# Patient Record
Sex: MALE | Race: BLACK OR AFRICAN AMERICAN | NOT HISPANIC OR LATINO | URBAN - METROPOLITAN AREA
[De-identification: names, ages, dates, MRNs, and addresses within clinical notes are randomized per-mention and may not be internally consistent; named-entity substitution may affect disease eponyms.]

---

## 2017-01-01 ENCOUNTER — INPATIENT (INPATIENT)
Facility: HOSPITAL | Age: 72
LOS: 16 days | Discharge: HOSPICE | DRG: 177 | End: 2017-02-11
Attending: INTERNAL MEDICINE
Payer: MEDICARE

## 2017-01-01 ENCOUNTER — INPATIENT (INPATIENT)
Facility: HOSPITAL | Age: 72
LOS: 0 days | DRG: 871 | End: 2017-03-10
Attending: INTERNAL MEDICINE | Admitting: INTERNAL MEDICINE
Payer: MEDICARE

## 2017-01-01 VITALS
HEIGHT: 67 IN | HEART RATE: 80 BPM | DIASTOLIC BLOOD PRESSURE: 83 MMHG | OXYGEN SATURATION: 100 % | RESPIRATION RATE: 20 BRPM | SYSTOLIC BLOOD PRESSURE: 139 MMHG | WEIGHT: 119.93 LBS

## 2017-01-01 VITALS
SYSTOLIC BLOOD PRESSURE: 88 MMHG | DIASTOLIC BLOOD PRESSURE: 55 MMHG | HEART RATE: 90 BPM | RESPIRATION RATE: 26 BRPM | WEIGHT: 115.08 LBS | OXYGEN SATURATION: 94 %

## 2017-01-01 VITALS
HEART RATE: 71 BPM | DIASTOLIC BLOOD PRESSURE: 68 MMHG | TEMPERATURE: 98 F | RESPIRATION RATE: 18 BRPM | OXYGEN SATURATION: 100 % | SYSTOLIC BLOOD PRESSURE: 108 MMHG

## 2017-01-01 VITALS — RESPIRATION RATE: 8 BRPM | OXYGEN SATURATION: 87 %

## 2017-01-01 DIAGNOSIS — G20 PARKINSON'S DISEASE: ICD-10-CM

## 2017-01-01 DIAGNOSIS — J18.9 PNEUMONIA, UNSPECIFIED ORGANISM: ICD-10-CM

## 2017-01-01 DIAGNOSIS — Z51.5 ENCOUNTER FOR PALLIATIVE CARE: ICD-10-CM

## 2017-01-01 DIAGNOSIS — J96.01 ACUTE RESPIRATORY FAILURE WITH HYPOXIA: ICD-10-CM

## 2017-01-01 DIAGNOSIS — T17.908A UNSPECIFIED FOREIGN BODY IN RESPIRATORY TRACT, PART UNSPECIFIED CAUSING OTHER INJURY, INITIAL ENCOUNTER: ICD-10-CM

## 2017-01-01 DIAGNOSIS — R13.10 DYSPHAGIA, UNSPECIFIED: ICD-10-CM

## 2017-01-01 DIAGNOSIS — J06.9 ACUTE UPPER RESPIRATORY INFECTION, UNSPECIFIED: ICD-10-CM

## 2017-01-01 DIAGNOSIS — R57.9 SHOCK, UNSPECIFIED: ICD-10-CM

## 2017-01-01 DIAGNOSIS — F41.9 ANXIETY DISORDER, UNSPECIFIED: ICD-10-CM

## 2017-01-01 DIAGNOSIS — Z98.890 OTHER SPECIFIED POSTPROCEDURAL STATES: Chronic | ICD-10-CM

## 2017-01-01 DIAGNOSIS — R53.81 OTHER MALAISE: ICD-10-CM

## 2017-01-01 LAB
ALBUMIN SERPL ELPH-MCNC: 4 G/DL — SIGNIFICANT CHANGE UP (ref 3.3–5.2)
ALP SERPL-CCNC: 246 U/L — HIGH (ref 40–120)
ALT FLD-CCNC: 39 U/L — SIGNIFICANT CHANGE UP
ANION GAP SERPL CALC-SCNC: 10 MMOL/L — SIGNIFICANT CHANGE UP (ref 5–17)
ANION GAP SERPL CALC-SCNC: 11 MMOL/L — SIGNIFICANT CHANGE UP (ref 5–17)
ANION GAP SERPL CALC-SCNC: 12 MMOL/L — SIGNIFICANT CHANGE UP (ref 5–17)
ANION GAP SERPL CALC-SCNC: 13 MMOL/L — SIGNIFICANT CHANGE UP (ref 5–17)
ANION GAP SERPL CALC-SCNC: 17 MMOL/L — SIGNIFICANT CHANGE UP (ref 5–17)
ANION GAP SERPL CALC-SCNC: 9 MMOL/L — SIGNIFICANT CHANGE UP (ref 5–17)
ANISOCYTOSIS BLD QL: SLIGHT — SIGNIFICANT CHANGE UP
ANISOCYTOSIS BLD QL: SLIGHT — SIGNIFICANT CHANGE UP
APPEARANCE UR: CLEAR — SIGNIFICANT CHANGE UP
APPEARANCE UR: CLEAR — SIGNIFICANT CHANGE UP
AST SERPL-CCNC: 42 U/L — HIGH
BASE EXCESS BLDA CALC-SCNC: -1.1 MMOL/L — SIGNIFICANT CHANGE UP (ref -3–3)
BASOPHILS # BLD AUTO: 0 K/UL — SIGNIFICANT CHANGE UP (ref 0–0.2)
BASOPHILS NFR BLD AUTO: 1 % — SIGNIFICANT CHANGE UP (ref 0–2)
BASOPHILS NFR BLD AUTO: 2.9 % — HIGH (ref 0–2)
BILIRUB SERPL-MCNC: 0.5 MG/DL — SIGNIFICANT CHANGE UP (ref 0.4–2)
BILIRUB UR-MCNC: NEGATIVE — SIGNIFICANT CHANGE UP
BILIRUB UR-MCNC: NEGATIVE — SIGNIFICANT CHANGE UP
BLOOD DRAW TIME CLOCK TIME: SIGNIFICANT CHANGE UP
BLOOD GAS COMMENTS ARTERIAL: SIGNIFICANT CHANGE UP
BUN SERPL-MCNC: 12 MG/DL — SIGNIFICANT CHANGE UP (ref 8–20)
BUN SERPL-MCNC: 17 MG/DL — SIGNIFICANT CHANGE UP (ref 8–20)
BUN SERPL-MCNC: 18 MG/DL — SIGNIFICANT CHANGE UP (ref 8–20)
BUN SERPL-MCNC: 19 MG/DL — SIGNIFICANT CHANGE UP (ref 8–20)
BUN SERPL-MCNC: 19 MG/DL — SIGNIFICANT CHANGE UP (ref 8–20)
BUN SERPL-MCNC: 20 MG/DL — SIGNIFICANT CHANGE UP (ref 8–20)
BUN SERPL-MCNC: 21 MG/DL — HIGH (ref 8–20)
BUN SERPL-MCNC: 21 MG/DL — HIGH (ref 8–20)
BUN SERPL-MCNC: 25 MG/DL — HIGH (ref 8–20)
CALCIUM SERPL-MCNC: 10.4 MG/DL — HIGH (ref 8.6–10.2)
CALCIUM SERPL-MCNC: 8.6 MG/DL — SIGNIFICANT CHANGE UP (ref 8.6–10.2)
CALCIUM SERPL-MCNC: 8.8 MG/DL — SIGNIFICANT CHANGE UP (ref 8.6–10.2)
CALCIUM SERPL-MCNC: 8.9 MG/DL — SIGNIFICANT CHANGE UP (ref 8.6–10.2)
CALCIUM SERPL-MCNC: 9 MG/DL — SIGNIFICANT CHANGE UP (ref 8.6–10.2)
CALCIUM SERPL-MCNC: 9.1 MG/DL — SIGNIFICANT CHANGE UP (ref 8.6–10.2)
CALCIUM SERPL-MCNC: 9.2 MG/DL — SIGNIFICANT CHANGE UP (ref 8.6–10.2)
CALCIUM SERPL-MCNC: 9.3 MG/DL — SIGNIFICANT CHANGE UP (ref 8.6–10.2)
CALCIUM SERPL-MCNC: 9.3 MG/DL — SIGNIFICANT CHANGE UP (ref 8.6–10.2)
CALCIUM SERPL-MCNC: 9.4 MG/DL — SIGNIFICANT CHANGE UP (ref 8.6–10.2)
CHLORIDE SERPL-SCNC: 101 MMOL/L — SIGNIFICANT CHANGE UP (ref 98–107)
CHLORIDE SERPL-SCNC: 102 MMOL/L — SIGNIFICANT CHANGE UP (ref 98–107)
CHLORIDE SERPL-SCNC: 102 MMOL/L — SIGNIFICANT CHANGE UP (ref 98–107)
CHLORIDE SERPL-SCNC: 103 MMOL/L — SIGNIFICANT CHANGE UP (ref 98–107)
CHLORIDE SERPL-SCNC: 105 MMOL/L — SIGNIFICANT CHANGE UP (ref 98–107)
CHLORIDE SERPL-SCNC: 106 MMOL/L — SIGNIFICANT CHANGE UP (ref 98–107)
CHLORIDE SERPL-SCNC: 107 MMOL/L — SIGNIFICANT CHANGE UP (ref 98–107)
CHLORIDE SERPL-SCNC: 107 MMOL/L — SIGNIFICANT CHANGE UP (ref 98–107)
CHLORIDE SERPL-SCNC: 108 MMOL/L — HIGH (ref 98–107)
CHLORIDE SERPL-SCNC: 113 MMOL/L — HIGH (ref 98–107)
CHLORIDE SERPL-SCNC: 98 MMOL/L — SIGNIFICANT CHANGE UP (ref 98–107)
CHLORIDE SERPL-SCNC: 98 MMOL/L — SIGNIFICANT CHANGE UP (ref 98–107)
CO2 SERPL-SCNC: 19 MMOL/L — LOW (ref 22–29)
CO2 SERPL-SCNC: 23 MMOL/L — SIGNIFICANT CHANGE UP (ref 22–29)
CO2 SERPL-SCNC: 24 MMOL/L — SIGNIFICANT CHANGE UP (ref 22–29)
CO2 SERPL-SCNC: 24 MMOL/L — SIGNIFICANT CHANGE UP (ref 22–29)
CO2 SERPL-SCNC: 25 MMOL/L — SIGNIFICANT CHANGE UP (ref 22–29)
CO2 SERPL-SCNC: 25 MMOL/L — SIGNIFICANT CHANGE UP (ref 22–29)
CO2 SERPL-SCNC: 26 MMOL/L — SIGNIFICANT CHANGE UP (ref 22–29)
CO2 SERPL-SCNC: 26 MMOL/L — SIGNIFICANT CHANGE UP (ref 22–29)
CO2 SERPL-SCNC: 27 MMOL/L — SIGNIFICANT CHANGE UP (ref 22–29)
CO2 SERPL-SCNC: 28 MMOL/L — SIGNIFICANT CHANGE UP (ref 22–29)
CO2 SERPL-SCNC: 28 MMOL/L — SIGNIFICANT CHANGE UP (ref 22–29)
CO2 SERPL-SCNC: 30 MMOL/L — HIGH (ref 22–29)
COLOR SPEC: YELLOW — SIGNIFICANT CHANGE UP
COLOR SPEC: YELLOW — SIGNIFICANT CHANGE UP
COMMENT - URINE: SIGNIFICANT CHANGE UP
CORTIS AM PEAK SERPL-MCNC: 39.3 UG/DL — HIGH (ref 6.2–19.4)
CREAT SERPL-MCNC: 0.69 MG/DL — SIGNIFICANT CHANGE UP (ref 0.5–1.3)
CREAT SERPL-MCNC: 0.71 MG/DL — SIGNIFICANT CHANGE UP (ref 0.5–1.3)
CREAT SERPL-MCNC: 0.75 MG/DL — SIGNIFICANT CHANGE UP (ref 0.5–1.3)
CREAT SERPL-MCNC: 0.78 MG/DL — SIGNIFICANT CHANGE UP (ref 0.5–1.3)
CREAT SERPL-MCNC: 0.8 MG/DL — SIGNIFICANT CHANGE UP (ref 0.5–1.3)
CREAT SERPL-MCNC: 0.83 MG/DL — SIGNIFICANT CHANGE UP (ref 0.5–1.3)
CREAT SERPL-MCNC: 0.85 MG/DL — SIGNIFICANT CHANGE UP (ref 0.5–1.3)
CREAT SERPL-MCNC: 0.87 MG/DL — SIGNIFICANT CHANGE UP (ref 0.5–1.3)
CREAT SERPL-MCNC: 0.89 MG/DL — SIGNIFICANT CHANGE UP (ref 0.5–1.3)
CREAT SERPL-MCNC: 0.89 MG/DL — SIGNIFICANT CHANGE UP (ref 0.5–1.3)
CREAT SERPL-MCNC: 0.91 MG/DL — SIGNIFICANT CHANGE UP (ref 0.5–1.3)
CREAT SERPL-MCNC: 0.93 MG/DL — SIGNIFICANT CHANGE UP (ref 0.5–1.3)
CULTURE RESULTS: NO GROWTH — SIGNIFICANT CHANGE UP
CULTURE RESULTS: NO GROWTH — SIGNIFICANT CHANGE UP
CULTURE RESULTS: SIGNIFICANT CHANGE UP
CULTURE RESULTS: SIGNIFICANT CHANGE UP
DIFF PNL FLD: ABNORMAL
DIFF PNL FLD: ABNORMAL
ELLIPTOCYTES BLD QL SMEAR: SLIGHT — SIGNIFICANT CHANGE UP
EPI CELLS # UR: SIGNIFICANT CHANGE UP
GAS PNL BLDA: SIGNIFICANT CHANGE UP
GLUCOSE SERPL-MCNC: 100 MG/DL — SIGNIFICANT CHANGE UP (ref 70–115)
GLUCOSE SERPL-MCNC: 100 MG/DL — SIGNIFICANT CHANGE UP (ref 70–115)
GLUCOSE SERPL-MCNC: 101 MG/DL — SIGNIFICANT CHANGE UP (ref 70–115)
GLUCOSE SERPL-MCNC: 114 MG/DL — SIGNIFICANT CHANGE UP (ref 70–115)
GLUCOSE SERPL-MCNC: 118 MG/DL — HIGH (ref 70–115)
GLUCOSE SERPL-MCNC: 119 MG/DL — HIGH (ref 70–115)
GLUCOSE SERPL-MCNC: 129 MG/DL — HIGH (ref 70–115)
GLUCOSE SERPL-MCNC: 135 MG/DL — HIGH (ref 70–115)
GLUCOSE SERPL-MCNC: 140 MG/DL — HIGH (ref 70–115)
GLUCOSE SERPL-MCNC: 74 MG/DL — SIGNIFICANT CHANGE UP (ref 70–115)
GLUCOSE SERPL-MCNC: 76 MG/DL — SIGNIFICANT CHANGE UP (ref 70–115)
GLUCOSE SERPL-MCNC: 78 MG/DL — SIGNIFICANT CHANGE UP (ref 70–115)
GLUCOSE UR QL: NEGATIVE MG/DL — SIGNIFICANT CHANGE UP
GLUCOSE UR QL: NEGATIVE MG/DL — SIGNIFICANT CHANGE UP
HCO3 BLDA-SCNC: 23 MMOL/L — SIGNIFICANT CHANGE UP (ref 20–26)
HCT VFR BLD CALC: 31.6 % — LOW (ref 42–52)
HCT VFR BLD CALC: 32.3 % — LOW (ref 42–52)
HCT VFR BLD CALC: 32.6 % — LOW (ref 42–52)
HCT VFR BLD CALC: 33.6 % — LOW (ref 42–52)
HCT VFR BLD CALC: 34.5 % — LOW (ref 42–52)
HCT VFR BLD CALC: 34.6 % — LOW (ref 42–52)
HCT VFR BLD CALC: 34.6 % — LOW (ref 42–52)
HCT VFR BLD CALC: 34.8 % — LOW (ref 42–52)
HCT VFR BLD CALC: 35.4 % — LOW (ref 42–52)
HCT VFR BLD CALC: 37.5 % — LOW (ref 42–52)
HCT VFR BLD CALC: 38 % — LOW (ref 42–52)
HCT VFR BLD CALC: 47.4 % — SIGNIFICANT CHANGE UP (ref 42–52)
HGB BLD-MCNC: 10.7 G/DL — LOW (ref 14–18)
HGB BLD-MCNC: 11.1 G/DL — LOW (ref 14–18)
HGB BLD-MCNC: 11.1 G/DL — LOW (ref 14–18)
HGB BLD-MCNC: 11.3 G/DL — LOW (ref 14–18)
HGB BLD-MCNC: 11.7 G/DL — LOW (ref 14–18)
HGB BLD-MCNC: 11.8 G/DL — LOW (ref 14–18)
HGB BLD-MCNC: 11.9 G/DL — LOW (ref 14–18)
HGB BLD-MCNC: 12 G/DL — LOW (ref 14–18)
HGB BLD-MCNC: 12 G/DL — LOW (ref 14–18)
HGB BLD-MCNC: 12.9 G/DL — LOW (ref 14–18)
HGB BLD-MCNC: 13 G/DL — LOW (ref 14–18)
HGB BLD-MCNC: 16.2 G/DL — SIGNIFICANT CHANGE UP (ref 14–18)
HOROWITZ INDEX BLDA+IHG-RTO: SIGNIFICANT CHANGE UP
HYPOCHROMIA BLD QL: SLIGHT — SIGNIFICANT CHANGE UP
HYPOCHROMIA BLD QL: SLIGHT — SIGNIFICANT CHANGE UP
KETONES UR-MCNC: NEGATIVE — SIGNIFICANT CHANGE UP
KETONES UR-MCNC: NEGATIVE — SIGNIFICANT CHANGE UP
LACTATE BLDV-MCNC: 1.6 MMOL/L — SIGNIFICANT CHANGE UP (ref 0.7–2)
LACTATE BLDV-MCNC: 5.6 MMOL/L — CRITICAL HIGH (ref 0.7–2)
LACTATE BLDV-MCNC: 6.3 MMOL/L — CRITICAL HIGH (ref 0.7–2)
LACTATE SERPL-SCNC: 1.5 MMOL/L — SIGNIFICANT CHANGE UP (ref 0.5–2)
LEUKOCYTE ESTERASE UR-ACNC: NEGATIVE — SIGNIFICANT CHANGE UP
LEUKOCYTE ESTERASE UR-ACNC: NEGATIVE — SIGNIFICANT CHANGE UP
LG PLATELETS BLD QL AUTO: SLIGHT — SIGNIFICANT CHANGE UP
LYMPHOCYTES # BLD AUTO: 0.2 K/UL — LOW (ref 1–4.8)
LYMPHOCYTES # BLD AUTO: 51.6 % — SIGNIFICANT CHANGE UP (ref 20–55)
LYMPHOCYTES # BLD AUTO: 6 % — LOW (ref 20–55)
MACROCYTES BLD QL: SLIGHT — SIGNIFICANT CHANGE UP
MACROCYTES BLD QL: SLIGHT — SIGNIFICANT CHANGE UP
MAGNESIUM SERPL-MCNC: 2 MG/DL — SIGNIFICANT CHANGE UP (ref 1.8–2.5)
MAGNESIUM SERPL-MCNC: 2 MG/DL — SIGNIFICANT CHANGE UP (ref 1.8–2.5)
MAGNESIUM SERPL-MCNC: 2.1 MG/DL — SIGNIFICANT CHANGE UP (ref 1.8–2.5)
MAGNESIUM SERPL-MCNC: 2.2 MG/DL — SIGNIFICANT CHANGE UP (ref 1.8–2.5)
MCHC RBC-ENTMCNC: 32.3 PG — HIGH (ref 27–31)
MCHC RBC-ENTMCNC: 32.5 PG — HIGH (ref 27–31)
MCHC RBC-ENTMCNC: 32.6 PG — HIGH (ref 27–31)
MCHC RBC-ENTMCNC: 32.7 PG — HIGH (ref 27–31)
MCHC RBC-ENTMCNC: 32.8 PG — HIGH (ref 27–31)
MCHC RBC-ENTMCNC: 32.9 PG — HIGH (ref 27–31)
MCHC RBC-ENTMCNC: 33 PG — HIGH (ref 27–31)
MCHC RBC-ENTMCNC: 33.1 PG — HIGH (ref 27–31)
MCHC RBC-ENTMCNC: 33.2 PG — HIGH (ref 27–31)
MCHC RBC-ENTMCNC: 33.3 PG — HIGH (ref 27–31)
MCHC RBC-ENTMCNC: 33.6 G/DL — SIGNIFICANT CHANGE UP (ref 32–36)
MCHC RBC-ENTMCNC: 33.6 G/DL — SIGNIFICANT CHANGE UP (ref 32–36)
MCHC RBC-ENTMCNC: 33.9 G/DL — SIGNIFICANT CHANGE UP (ref 32–36)
MCHC RBC-ENTMCNC: 33.9 G/DL — SIGNIFICANT CHANGE UP (ref 32–36)
MCHC RBC-ENTMCNC: 34 G/DL — SIGNIFICANT CHANGE UP (ref 32–36)
MCHC RBC-ENTMCNC: 34.1 G/DL — SIGNIFICANT CHANGE UP (ref 32–36)
MCHC RBC-ENTMCNC: 34.2 G/DL — SIGNIFICANT CHANGE UP (ref 32–36)
MCHC RBC-ENTMCNC: 34.2 G/DL — SIGNIFICANT CHANGE UP (ref 32–36)
MCHC RBC-ENTMCNC: 34.4 G/DL — SIGNIFICANT CHANGE UP (ref 32–36)
MCHC RBC-ENTMCNC: 34.4 G/DL — SIGNIFICANT CHANGE UP (ref 32–36)
MCHC RBC-ENTMCNC: 34.5 G/DL — SIGNIFICANT CHANGE UP (ref 32–36)
MCHC RBC-ENTMCNC: 34.7 G/DL — SIGNIFICANT CHANGE UP (ref 32–36)
MCV RBC AUTO: 94.4 FL — HIGH (ref 80–94)
MCV RBC AUTO: 94.8 FL — HIGH (ref 80–94)
MCV RBC AUTO: 94.8 FL — HIGH (ref 80–94)
MCV RBC AUTO: 95.2 FL — HIGH (ref 80–94)
MCV RBC AUTO: 96 FL — HIGH (ref 80–94)
MCV RBC AUTO: 96.2 FL — HIGH (ref 80–94)
MCV RBC AUTO: 96.4 FL — HIGH (ref 80–94)
MCV RBC AUTO: 96.4 FL — HIGH (ref 80–94)
MCV RBC AUTO: 96.6 FL — HIGH (ref 80–94)
MCV RBC AUTO: 96.9 FL — HIGH (ref 80–94)
MCV RBC AUTO: 97.5 FL — HIGH (ref 80–94)
MCV RBC AUTO: 97.5 FL — HIGH (ref 80–94)
MICROCYTES BLD QL: SLIGHT — SIGNIFICANT CHANGE UP
MICROCYTES BLD QL: SLIGHT — SIGNIFICANT CHANGE UP
MONOCYTES # BLD AUTO: 0 K/UL — SIGNIFICANT CHANGE UP (ref 0–0.8)
MONOCYTES NFR BLD AUTO: 5 % — SIGNIFICANT CHANGE UP (ref 3–10)
MONOCYTES NFR BLD AUTO: 9.7 % — SIGNIFICANT CHANGE UP (ref 3–10)
NEUTROPHILS # BLD AUTO: 0.1 K/UL — LOW (ref 1.8–8)
NEUTROPHILS NFR BLD AUTO: 35.5 % — LOW (ref 37–73)
NEUTROPHILS NFR BLD AUTO: 87 % — HIGH (ref 37–73)
NITRITE UR-MCNC: NEGATIVE — SIGNIFICANT CHANGE UP
NITRITE UR-MCNC: NEGATIVE — SIGNIFICANT CHANGE UP
OVALOCYTES BLD QL SMEAR: SLIGHT — SIGNIFICANT CHANGE UP
PCO2 BLDA: 35 MMHG — SIGNIFICANT CHANGE UP (ref 35–45)
PH BLDA: 7.42 — SIGNIFICANT CHANGE UP (ref 7.35–7.45)
PH UR: 5 — SIGNIFICANT CHANGE UP (ref 4.8–8)
PH UR: 6 — SIGNIFICANT CHANGE UP (ref 4.8–8)
PLAT MORPH BLD: NORMAL — SIGNIFICANT CHANGE UP
PLAT MORPH BLD: NORMAL — SIGNIFICANT CHANGE UP
PLATELET # BLD AUTO: 151 K/UL — SIGNIFICANT CHANGE UP (ref 150–400)
PLATELET # BLD AUTO: 172 K/UL — SIGNIFICANT CHANGE UP (ref 150–400)
PLATELET # BLD AUTO: 173 K/UL — SIGNIFICANT CHANGE UP (ref 150–400)
PLATELET # BLD AUTO: 177 K/UL — SIGNIFICANT CHANGE UP (ref 150–400)
PLATELET # BLD AUTO: 193 K/UL — SIGNIFICANT CHANGE UP (ref 150–400)
PLATELET # BLD AUTO: 200 K/UL — SIGNIFICANT CHANGE UP (ref 150–400)
PLATELET # BLD AUTO: 267 K/UL — SIGNIFICANT CHANGE UP (ref 150–400)
PLATELET # BLD AUTO: 268 K/UL — SIGNIFICANT CHANGE UP (ref 150–400)
PLATELET # BLD AUTO: 312 K/UL — SIGNIFICANT CHANGE UP (ref 150–400)
PLATELET # BLD AUTO: 322 K/UL — SIGNIFICANT CHANGE UP (ref 150–400)
PLATELET # BLD AUTO: 460 K/UL — HIGH (ref 150–400)
PLATELET # BLD AUTO: 97 K/UL — LOW (ref 150–400)
PO2 BLDA: 80 MMHG — LOW (ref 83–108)
POIKILOCYTOSIS BLD QL AUTO: SLIGHT — SIGNIFICANT CHANGE UP
POIKILOCYTOSIS BLD QL AUTO: SLIGHT — SIGNIFICANT CHANGE UP
POTASSIUM SERPL-MCNC: 3.1 MMOL/L — LOW (ref 3.5–5.3)
POTASSIUM SERPL-MCNC: 3.6 MMOL/L — SIGNIFICANT CHANGE UP (ref 3.5–5.3)
POTASSIUM SERPL-MCNC: 3.7 MMOL/L — SIGNIFICANT CHANGE UP (ref 3.5–5.3)
POTASSIUM SERPL-MCNC: 3.7 MMOL/L — SIGNIFICANT CHANGE UP (ref 3.5–5.3)
POTASSIUM SERPL-MCNC: 3.9 MMOL/L — SIGNIFICANT CHANGE UP (ref 3.5–5.3)
POTASSIUM SERPL-MCNC: 4.1 MMOL/L — SIGNIFICANT CHANGE UP (ref 3.5–5.3)
POTASSIUM SERPL-MCNC: 4.2 MMOL/L — SIGNIFICANT CHANGE UP (ref 3.5–5.3)
POTASSIUM SERPL-MCNC: 4.3 MMOL/L — SIGNIFICANT CHANGE UP (ref 3.5–5.3)
POTASSIUM SERPL-MCNC: 4.4 MMOL/L — SIGNIFICANT CHANGE UP (ref 3.5–5.3)
POTASSIUM SERPL-MCNC: 4.8 MMOL/L — SIGNIFICANT CHANGE UP (ref 3.5–5.3)
POTASSIUM SERPL-MCNC: 5.1 MMOL/L — SIGNIFICANT CHANGE UP (ref 3.5–5.3)
POTASSIUM SERPL-MCNC: 5.5 MMOL/L — HIGH (ref 3.5–5.3)
POTASSIUM SERPL-SCNC: 3.1 MMOL/L — LOW (ref 3.5–5.3)
POTASSIUM SERPL-SCNC: 3.6 MMOL/L — SIGNIFICANT CHANGE UP (ref 3.5–5.3)
POTASSIUM SERPL-SCNC: 3.7 MMOL/L — SIGNIFICANT CHANGE UP (ref 3.5–5.3)
POTASSIUM SERPL-SCNC: 3.7 MMOL/L — SIGNIFICANT CHANGE UP (ref 3.5–5.3)
POTASSIUM SERPL-SCNC: 3.9 MMOL/L — SIGNIFICANT CHANGE UP (ref 3.5–5.3)
POTASSIUM SERPL-SCNC: 4.1 MMOL/L — SIGNIFICANT CHANGE UP (ref 3.5–5.3)
POTASSIUM SERPL-SCNC: 4.2 MMOL/L — SIGNIFICANT CHANGE UP (ref 3.5–5.3)
POTASSIUM SERPL-SCNC: 4.3 MMOL/L — SIGNIFICANT CHANGE UP (ref 3.5–5.3)
POTASSIUM SERPL-SCNC: 4.4 MMOL/L — SIGNIFICANT CHANGE UP (ref 3.5–5.3)
POTASSIUM SERPL-SCNC: 4.8 MMOL/L — SIGNIFICANT CHANGE UP (ref 3.5–5.3)
POTASSIUM SERPL-SCNC: 5.1 MMOL/L — SIGNIFICANT CHANGE UP (ref 3.5–5.3)
POTASSIUM SERPL-SCNC: 5.5 MMOL/L — HIGH (ref 3.5–5.3)
PROT SERPL-MCNC: 8.3 G/DL — SIGNIFICANT CHANGE UP (ref 6.6–8.7)
PROT UR-MCNC: 15 MG/DL
PROT UR-MCNC: NEGATIVE MG/DL — SIGNIFICANT CHANGE UP
RAPID RVP RESULT: DETECTED
RAPID RVP RESULT: SIGNIFICANT CHANGE UP
RBC # BLD: 3.29 M/UL — LOW (ref 4.6–6.2)
RBC # BLD: 3.38 M/UL — LOW (ref 4.6–6.2)
RBC # BLD: 3.42 M/UL — LOW (ref 4.6–6.2)
RBC # BLD: 3.48 M/UL — LOW (ref 4.6–6.2)
RBC # BLD: 3.55 M/UL — LOW (ref 4.6–6.2)
RBC # BLD: 3.59 M/UL — LOW (ref 4.6–6.2)
RBC # BLD: 3.64 M/UL — LOW (ref 4.6–6.2)
RBC # BLD: 3.65 M/UL — LOW (ref 4.6–6.2)
RBC # BLD: 3.72 M/UL — LOW (ref 4.6–6.2)
RBC # BLD: 3.9 M/UL — LOW (ref 4.6–6.2)
RBC # BLD: 3.94 M/UL — LOW (ref 4.6–6.2)
RBC # BLD: 4.86 M/UL — SIGNIFICANT CHANGE UP (ref 4.6–6.2)
RBC # FLD: 15.2 % — SIGNIFICANT CHANGE UP (ref 11–15.6)
RBC # FLD: 15.2 % — SIGNIFICANT CHANGE UP (ref 11–15.6)
RBC # FLD: 15.3 % — SIGNIFICANT CHANGE UP (ref 11–15.6)
RBC # FLD: 15.4 % — SIGNIFICANT CHANGE UP (ref 11–15.6)
RBC # FLD: 15.5 % — SIGNIFICANT CHANGE UP (ref 11–15.6)
RBC # FLD: 15.6 % — SIGNIFICANT CHANGE UP (ref 11–15.6)
RBC # FLD: 15.6 % — SIGNIFICANT CHANGE UP (ref 11–15.6)
RBC # FLD: 15.7 % — HIGH (ref 11–15.6)
RBC # FLD: 16.3 % — HIGH (ref 11–15.6)
RBC # FLD: 16.9 % — HIGH (ref 11–15.6)
RBC BLD AUTO: ABNORMAL
RBC BLD AUTO: ABNORMAL
RBC CASTS # UR COMP ASSIST: ABNORMAL /HPF (ref 0–4)
RBC CASTS # UR COMP ASSIST: SIGNIFICANT CHANGE UP /HPF (ref 0–4)
RV+EV RNA SPEC QL NAA+PROBE: DETECTED
SAO2 % BLDA: 96 % — SIGNIFICANT CHANGE UP (ref 95–99)
SODIUM SERPL-SCNC: 135 MMOL/L — SIGNIFICANT CHANGE UP (ref 135–145)
SODIUM SERPL-SCNC: 135 MMOL/L — SIGNIFICANT CHANGE UP (ref 135–145)
SODIUM SERPL-SCNC: 137 MMOL/L — SIGNIFICANT CHANGE UP (ref 135–145)
SODIUM SERPL-SCNC: 138 MMOL/L — SIGNIFICANT CHANGE UP (ref 135–145)
SODIUM SERPL-SCNC: 140 MMOL/L — SIGNIFICANT CHANGE UP (ref 135–145)
SODIUM SERPL-SCNC: 140 MMOL/L — SIGNIFICANT CHANGE UP (ref 135–145)
SODIUM SERPL-SCNC: 141 MMOL/L — SIGNIFICANT CHANGE UP (ref 135–145)
SODIUM SERPL-SCNC: 143 MMOL/L — SIGNIFICANT CHANGE UP (ref 135–145)
SODIUM SERPL-SCNC: 144 MMOL/L — SIGNIFICANT CHANGE UP (ref 135–145)
SODIUM SERPL-SCNC: 147 MMOL/L — HIGH (ref 135–145)
SODIUM SERPL-SCNC: 147 MMOL/L — HIGH (ref 135–145)
SODIUM SERPL-SCNC: 148 MMOL/L — HIGH (ref 135–145)
SP GR SPEC: 1.02 — SIGNIFICANT CHANGE UP (ref 1.01–1.02)
SP GR SPEC: 1.02 — SIGNIFICANT CHANGE UP (ref 1.01–1.02)
SPECIMEN SOURCE: SIGNIFICANT CHANGE UP
TSH RECEP AB SER-ACNC: 2.12 — SIGNIFICANT CHANGE UP
TSH SERPL-MCNC: 2.56 UIU/ML — SIGNIFICANT CHANGE UP (ref 0.27–4.2)
UROBILINOGEN FLD QL: NEGATIVE MG/DL — SIGNIFICANT CHANGE UP
UROBILINOGEN FLD QL: NEGATIVE MG/DL — SIGNIFICANT CHANGE UP
VARIANT LYMPHS # BLD: 1 % — SIGNIFICANT CHANGE UP (ref 0–6)
VIT B12 SERPL-MCNC: 1059 PG/ML — HIGH (ref 180–914)
WBC # BLD: 0.3 K/UL — CRITICAL LOW (ref 4.8–10.8)
WBC # BLD: 11.07 K/UL — HIGH (ref 4.8–10.8)
WBC # BLD: 11.28 K/UL — HIGH (ref 4.8–10.8)
WBC # BLD: 6.53 K/UL — SIGNIFICANT CHANGE UP (ref 4.8–10.8)
WBC # BLD: 6.66 K/UL — SIGNIFICANT CHANGE UP (ref 4.8–10.8)
WBC # BLD: 7.33 K/UL — SIGNIFICANT CHANGE UP (ref 4.8–10.8)
WBC # BLD: 7.7 K/UL — SIGNIFICANT CHANGE UP (ref 4.8–10.8)
WBC # BLD: 8.04 K/UL — SIGNIFICANT CHANGE UP (ref 4.8–10.8)
WBC # BLD: 8.38 K/UL — SIGNIFICANT CHANGE UP (ref 4.8–10.8)
WBC # BLD: 9.26 K/UL — SIGNIFICANT CHANGE UP (ref 4.8–10.8)
WBC # BLD: 9.46 K/UL — SIGNIFICANT CHANGE UP (ref 4.8–10.8)
WBC # BLD: 9.79 K/UL — SIGNIFICANT CHANGE UP (ref 4.8–10.8)
WBC # FLD AUTO: 0.3 K/UL — CRITICAL LOW (ref 4.8–10.8)
WBC # FLD AUTO: 11.07 K/UL — HIGH (ref 4.8–10.8)
WBC # FLD AUTO: 11.28 K/UL — HIGH (ref 4.8–10.8)
WBC # FLD AUTO: 6.53 K/UL — SIGNIFICANT CHANGE UP (ref 4.8–10.8)
WBC # FLD AUTO: 6.66 K/UL — SIGNIFICANT CHANGE UP (ref 4.8–10.8)
WBC # FLD AUTO: 7.33 K/UL — SIGNIFICANT CHANGE UP (ref 4.8–10.8)
WBC # FLD AUTO: 7.7 K/UL — SIGNIFICANT CHANGE UP (ref 4.8–10.8)
WBC # FLD AUTO: 8.04 K/UL — SIGNIFICANT CHANGE UP (ref 4.8–10.8)
WBC # FLD AUTO: 8.38 K/UL — SIGNIFICANT CHANGE UP (ref 4.8–10.8)
WBC # FLD AUTO: 9.26 K/UL — SIGNIFICANT CHANGE UP (ref 4.8–10.8)
WBC # FLD AUTO: 9.46 K/UL — SIGNIFICANT CHANGE UP (ref 4.8–10.8)
WBC # FLD AUTO: 9.79 K/UL — SIGNIFICANT CHANGE UP (ref 4.8–10.8)
WBC UR QL: SIGNIFICANT CHANGE UP

## 2017-01-01 PROCEDURE — 71010: CPT | Mod: 26,77

## 2017-01-01 PROCEDURE — 99222 1ST HOSP IP/OBS MODERATE 55: CPT

## 2017-01-01 PROCEDURE — 99233 SBSQ HOSP IP/OBS HIGH 50: CPT

## 2017-01-01 PROCEDURE — 43752 NASAL/OROGASTRIC W/TUBE PLMT: CPT

## 2017-01-01 PROCEDURE — 71010: CPT | Mod: 26

## 2017-01-01 PROCEDURE — 99284 EMERGENCY DEPT VISIT MOD MDM: CPT

## 2017-01-01 PROCEDURE — 99497 ADVNCD CARE PLAN 30 MIN: CPT | Mod: 25

## 2017-01-01 PROCEDURE — 99285 EMERGENCY DEPT VISIT HI MDM: CPT | Mod: 25

## 2017-01-01 PROCEDURE — 99356: CPT

## 2017-01-01 PROCEDURE — 99223 1ST HOSP IP/OBS HIGH 75: CPT

## 2017-01-01 PROCEDURE — 93010 ELECTROCARDIOGRAM REPORT: CPT

## 2017-01-01 PROCEDURE — 36556 INSERT NON-TUNNEL CV CATH: CPT

## 2017-01-01 PROCEDURE — 99232 SBSQ HOSP IP/OBS MODERATE 35: CPT

## 2017-01-01 PROCEDURE — 74000: CPT | Mod: 26

## 2017-01-01 PROCEDURE — 70450 CT HEAD/BRAIN W/O DYE: CPT | Mod: 26

## 2017-01-01 RX ORDER — SODIUM CHLORIDE 9 MG/ML
500 INJECTION INTRAMUSCULAR; INTRAVENOUS; SUBCUTANEOUS ONCE
Qty: 0 | Refills: 0 | Status: COMPLETED | OUTPATIENT
Start: 2017-01-01 | End: 2017-01-01

## 2017-01-01 RX ORDER — IPRATROPIUM/ALBUTEROL SULFATE 18-103MCG
3 AEROSOL WITH ADAPTER (GRAM) INHALATION ONCE
Qty: 0 | Refills: 0 | Status: COMPLETED | OUTPATIENT
Start: 2017-01-01 | End: 2017-01-01

## 2017-01-01 RX ORDER — OXYCODONE HYDROCHLORIDE 5 MG/1
5 TABLET ORAL EVERY 6 HOURS
Qty: 0 | Refills: 0 | Status: DISCONTINUED | OUTPATIENT
Start: 2017-01-01 | End: 2017-01-01

## 2017-01-01 RX ORDER — SODIUM CHLORIDE 9 MG/ML
1000 INJECTION INTRAMUSCULAR; INTRAVENOUS; SUBCUTANEOUS
Qty: 0 | Refills: 0 | Status: DISCONTINUED | OUTPATIENT
Start: 2017-01-01 | End: 2017-01-01

## 2017-01-01 RX ORDER — SODIUM CHLORIDE 9 MG/ML
1000 INJECTION INTRAMUSCULAR; INTRAVENOUS; SUBCUTANEOUS ONCE
Qty: 0 | Refills: 0 | Status: COMPLETED | OUTPATIENT
Start: 2017-01-01 | End: 2017-01-01

## 2017-01-01 RX ORDER — HYDROMORPHONE HYDROCHLORIDE 2 MG/ML
0.5 INJECTION INTRAMUSCULAR; INTRAVENOUS; SUBCUTANEOUS EVERY 4 HOURS
Qty: 0 | Refills: 0 | Status: DISCONTINUED | OUTPATIENT
Start: 2017-01-01 | End: 2017-01-01

## 2017-01-01 RX ORDER — CARBIDOPA AND LEVODOPA 25; 100 MG/1; MG/1
1 TABLET ORAL
Qty: 0 | Refills: 0 | Status: DISCONTINUED | OUTPATIENT
Start: 2017-01-01 | End: 2017-01-01

## 2017-01-01 RX ORDER — EPINEPHRINE 0.3 MG/.3ML
3 INJECTION INTRAMUSCULAR; SUBCUTANEOUS
Qty: 100 | Refills: 0 | OUTPATIENT
Start: 2017-01-01

## 2017-01-01 RX ORDER — CARBIDOPA AND LEVODOPA 25; 100 MG/1; MG/1
1 TABLET ORAL
Qty: 0 | Refills: 0 | COMMUNITY
Start: 2017-01-01

## 2017-01-01 RX ORDER — INFLUENZA VIRUS VACCINE 15; 15; 15; 15 UG/.5ML; UG/.5ML; UG/.5ML; UG/.5ML
0.5 SUSPENSION INTRAMUSCULAR ONCE
Qty: 0 | Refills: 0 | Status: COMPLETED | OUTPATIENT
Start: 2017-01-01 | End: 2017-01-01

## 2017-01-01 RX ORDER — LACTULOSE 10 G/15ML
10 SOLUTION ORAL EVERY 8 HOURS
Qty: 0 | Refills: 0 | Status: DISCONTINUED | OUTPATIENT
Start: 2017-01-01 | End: 2017-01-01

## 2017-01-01 RX ORDER — SIMETHICONE 80 MG/1
80 TABLET, CHEWABLE ORAL ONCE
Qty: 0 | Refills: 0 | Status: COMPLETED | OUTPATIENT
Start: 2017-01-01 | End: 2017-01-01

## 2017-01-01 RX ORDER — ACETAMINOPHEN 500 MG
650 TABLET ORAL EVERY 6 HOURS
Qty: 0 | Refills: 0 | Status: DISCONTINUED | OUTPATIENT
Start: 2017-01-01 | End: 2017-01-01

## 2017-01-01 RX ORDER — AMANTADINE HCL 100 MG
100 CAPSULE ORAL
Qty: 0 | Refills: 0 | Status: DISCONTINUED | OUTPATIENT
Start: 2017-01-01 | End: 2017-01-01

## 2017-01-01 RX ORDER — DOCUSATE SODIUM 100 MG
100 CAPSULE ORAL
Qty: 0 | Refills: 0 | Status: DISCONTINUED | OUTPATIENT
Start: 2017-01-01 | End: 2017-01-01

## 2017-01-01 RX ORDER — NOREPINEPHRINE BITARTRATE/D5W 8 MG/250ML
0.01 PLASTIC BAG, INJECTION (ML) INTRAVENOUS
Qty: 8 | Refills: 0 | Status: DISCONTINUED | OUTPATIENT
Start: 2017-01-01 | End: 2017-01-01

## 2017-01-01 RX ORDER — SODIUM CHLORIDE 9 MG/ML
1000 INJECTION, SOLUTION INTRAVENOUS
Qty: 0 | Refills: 0 | Status: DISCONTINUED | OUTPATIENT
Start: 2017-01-01 | End: 2017-01-01

## 2017-01-01 RX ORDER — ACETYLCYSTEINE 200 MG/ML
2 VIAL (ML) MISCELLANEOUS EVERY 12 HOURS
Qty: 0 | Refills: 0 | Status: DISCONTINUED | OUTPATIENT
Start: 2017-01-01 | End: 2017-01-01

## 2017-01-01 RX ORDER — NOREPINEPHRINE BITARTRATE/D5W 8 MG/250ML
0.01 PLASTIC BAG, INJECTION (ML) INTRAVENOUS
Qty: 16 | Refills: 0 | Status: DISCONTINUED | OUTPATIENT
Start: 2017-01-01 | End: 2017-01-01

## 2017-01-01 RX ORDER — VANCOMYCIN HCL 1 G
1000 VIAL (EA) INTRAVENOUS ONCE
Qty: 0 | Refills: 0 | Status: COMPLETED | OUTPATIENT
Start: 2017-01-01 | End: 2017-01-01

## 2017-01-01 RX ORDER — ALBUTEROL 90 UG/1
1.25 AEROSOL, METERED ORAL EVERY 6 HOURS
Qty: 0 | Refills: 0 | Status: DISCONTINUED | OUTPATIENT
Start: 2017-01-01 | End: 2017-01-01

## 2017-01-01 RX ORDER — PIPERACILLIN AND TAZOBACTAM 4; .5 G/20ML; G/20ML
3.38 INJECTION, POWDER, LYOPHILIZED, FOR SOLUTION INTRAVENOUS ONCE
Qty: 0 | Refills: 0 | Status: COMPLETED | OUTPATIENT
Start: 2017-01-01 | End: 2017-01-01

## 2017-01-01 RX ORDER — ACETYLCYSTEINE 200 MG/ML
50 VIAL (ML) MISCELLANEOUS
Qty: 0 | Refills: 0 | Status: DISCONTINUED | OUTPATIENT
Start: 2017-01-01 | End: 2017-01-01

## 2017-01-01 RX ORDER — POTASSIUM CHLORIDE 20 MEQ
40 PACKET (EA) ORAL EVERY 4 HOURS
Qty: 0 | Refills: 0 | Status: COMPLETED | OUTPATIENT
Start: 2017-01-01 | End: 2017-01-01

## 2017-01-01 RX ORDER — MORPHINE SULFATE 50 MG/1
2 CAPSULE, EXTENDED RELEASE ORAL ONCE
Qty: 0 | Refills: 0 | Status: DISCONTINUED | OUTPATIENT
Start: 2017-01-01 | End: 2017-01-01

## 2017-01-01 RX ORDER — HEPARIN SODIUM 5000 [USP'U]/ML
5000 INJECTION INTRAVENOUS; SUBCUTANEOUS EVERY 12 HOURS
Qty: 0 | Refills: 0 | Status: DISCONTINUED | OUTPATIENT
Start: 2017-01-01 | End: 2017-01-01

## 2017-01-01 RX ORDER — SALIVA SUBSTITUTE COMB NO.11 351 MG
1 POWDER IN PACKET (EA) MUCOUS MEMBRANE THREE TIMES A DAY
Qty: 0 | Refills: 0 | Status: DISCONTINUED | OUTPATIENT
Start: 2017-01-01 | End: 2017-01-01

## 2017-01-01 RX ORDER — ROBINUL 0.2 MG/ML
0.4 INJECTION INTRAMUSCULAR; INTRAVENOUS EVERY 6 HOURS
Qty: 0 | Refills: 0 | Status: DISCONTINUED | OUTPATIENT
Start: 2017-01-01 | End: 2017-01-01

## 2017-01-01 RX ORDER — SENNA PLUS 8.6 MG/1
2 TABLET ORAL
Qty: 0 | Refills: 0 | Status: DISCONTINUED | OUTPATIENT
Start: 2017-01-01 | End: 2017-01-01

## 2017-01-01 RX ORDER — LACTULOSE 10 G/15ML
15 SOLUTION ORAL
Qty: 1 | Refills: 0 | OUTPATIENT
Start: 2017-01-01 | End: 2017-01-01

## 2017-01-01 RX ADMIN — Medication 100 MILLIGRAM(S): at 17:59

## 2017-01-01 RX ADMIN — ALBUTEROL 1.25 MILLIGRAM(S): 90 AEROSOL, METERED ORAL at 03:37

## 2017-01-01 RX ADMIN — SIMETHICONE 80 MILLIGRAM(S): 80 TABLET, CHEWABLE ORAL at 00:51

## 2017-01-01 RX ADMIN — SENNA PLUS 2 TABLET(S): 8.6 TABLET ORAL at 17:33

## 2017-01-01 RX ADMIN — ALBUTEROL 1.25 MILLIGRAM(S): 90 AEROSOL, METERED ORAL at 08:16

## 2017-01-01 RX ADMIN — OXYCODONE HYDROCHLORIDE 5 MILLIGRAM(S): 5 TABLET ORAL at 02:09

## 2017-01-01 RX ADMIN — OXYCODONE HYDROCHLORIDE 5 MILLIGRAM(S): 5 TABLET ORAL at 22:33

## 2017-01-01 RX ADMIN — Medication 1 SPRAY(S): at 05:25

## 2017-01-01 RX ADMIN — Medication 1 SPRAY(S): at 16:02

## 2017-01-01 RX ADMIN — ALBUTEROL 1.25 MILLIGRAM(S): 90 AEROSOL, METERED ORAL at 15:15

## 2017-01-01 RX ADMIN — SENNA PLUS 2 TABLET(S): 8.6 TABLET ORAL at 17:37

## 2017-01-01 RX ADMIN — Medication 1 SPRAY(S): at 14:23

## 2017-01-01 RX ADMIN — CARBIDOPA AND LEVODOPA 1 TABLET(S): 25; 100 TABLET ORAL at 06:26

## 2017-01-01 RX ADMIN — Medication 1 SPRAY(S): at 05:11

## 2017-01-01 RX ADMIN — CARBIDOPA AND LEVODOPA 1 TABLET(S): 25; 100 TABLET ORAL at 13:35

## 2017-01-01 RX ADMIN — HEPARIN SODIUM 5000 UNIT(S): 5000 INJECTION INTRAVENOUS; SUBCUTANEOUS at 05:50

## 2017-01-01 RX ADMIN — Medication 1 SPRAY(S): at 15:11

## 2017-01-01 RX ADMIN — SODIUM CHLORIDE 1000 MILLILITER(S): 9 INJECTION INTRAMUSCULAR; INTRAVENOUS; SUBCUTANEOUS at 13:45

## 2017-01-01 RX ADMIN — CARBIDOPA AND LEVODOPA 1 TABLET(S): 25; 100 TABLET ORAL at 11:39

## 2017-01-01 RX ADMIN — SENNA PLUS 2 TABLET(S): 8.6 TABLET ORAL at 17:59

## 2017-01-01 RX ADMIN — CARBIDOPA AND LEVODOPA 1 TABLET(S): 25; 100 TABLET ORAL at 18:21

## 2017-01-01 RX ADMIN — Medication 1 SPRAY(S): at 06:02

## 2017-01-01 RX ADMIN — Medication 1 SPRAY(S): at 22:56

## 2017-01-01 RX ADMIN — CARBIDOPA AND LEVODOPA 1 TABLET(S): 25; 100 TABLET ORAL at 17:33

## 2017-01-01 RX ADMIN — SODIUM CHLORIDE 1000 MILLILITER(S): 9 INJECTION INTRAMUSCULAR; INTRAVENOUS; SUBCUTANEOUS at 12:00

## 2017-01-01 RX ADMIN — ALBUTEROL 1.25 MILLIGRAM(S): 90 AEROSOL, METERED ORAL at 03:34

## 2017-01-01 RX ADMIN — HEPARIN SODIUM 5000 UNIT(S): 5000 INJECTION INTRAVENOUS; SUBCUTANEOUS at 17:46

## 2017-01-01 RX ADMIN — HEPARIN SODIUM 5000 UNIT(S): 5000 INJECTION INTRAVENOUS; SUBCUTANEOUS at 18:46

## 2017-01-01 RX ADMIN — Medication 100 MILLIGRAM(S): at 11:39

## 2017-01-01 RX ADMIN — CARBIDOPA AND LEVODOPA 1 TABLET(S): 25; 100 TABLET ORAL at 05:54

## 2017-01-01 RX ADMIN — SODIUM CHLORIDE 100 MILLILITER(S): 9 INJECTION, SOLUTION INTRAVENOUS at 12:40

## 2017-01-01 RX ADMIN — HEPARIN SODIUM 5000 UNIT(S): 5000 INJECTION INTRAVENOUS; SUBCUTANEOUS at 06:01

## 2017-01-01 RX ADMIN — SODIUM CHLORIDE 125 MILLILITER(S): 9 INJECTION INTRAMUSCULAR; INTRAVENOUS; SUBCUTANEOUS at 04:07

## 2017-01-01 RX ADMIN — ALBUTEROL 1.25 MILLIGRAM(S): 90 AEROSOL, METERED ORAL at 09:16

## 2017-01-01 RX ADMIN — ALBUTEROL 1.25 MILLIGRAM(S): 90 AEROSOL, METERED ORAL at 20:35

## 2017-01-01 RX ADMIN — LACTULOSE 10 GRAM(S): 10 SOLUTION ORAL at 20:58

## 2017-01-01 RX ADMIN — SODIUM CHLORIDE 125 MILLILITER(S): 9 INJECTION INTRAMUSCULAR; INTRAVENOUS; SUBCUTANEOUS at 12:36

## 2017-01-01 RX ADMIN — HEPARIN SODIUM 5000 UNIT(S): 5000 INJECTION INTRAVENOUS; SUBCUTANEOUS at 18:24

## 2017-01-01 RX ADMIN — Medication 100 MILLIGRAM(S): at 12:16

## 2017-01-01 RX ADMIN — SODIUM CHLORIDE 125 MILLILITER(S): 9 INJECTION, SOLUTION INTRAVENOUS at 02:02

## 2017-01-01 RX ADMIN — CARBIDOPA AND LEVODOPA 1 TABLET(S): 25; 100 TABLET ORAL at 00:36

## 2017-01-01 RX ADMIN — ALBUTEROL 1.25 MILLIGRAM(S): 90 AEROSOL, METERED ORAL at 08:41

## 2017-01-01 RX ADMIN — HEPARIN SODIUM 5000 UNIT(S): 5000 INJECTION INTRAVENOUS; SUBCUTANEOUS at 05:56

## 2017-01-01 RX ADMIN — SENNA PLUS 2 TABLET(S): 8.6 TABLET ORAL at 05:49

## 2017-01-01 RX ADMIN — Medication 1 SPRAY(S): at 01:02

## 2017-01-01 RX ADMIN — ALBUTEROL 1.25 MILLIGRAM(S): 90 AEROSOL, METERED ORAL at 09:17

## 2017-01-01 RX ADMIN — HEPARIN SODIUM 5000 UNIT(S): 5000 INJECTION INTRAVENOUS; SUBCUTANEOUS at 05:54

## 2017-01-01 RX ADMIN — ALBUTEROL 1.25 MILLIGRAM(S): 90 AEROSOL, METERED ORAL at 15:34

## 2017-01-01 RX ADMIN — MORPHINE SULFATE 2 MILLIGRAM(S): 50 CAPSULE, EXTENDED RELEASE ORAL at 13:05

## 2017-01-01 RX ADMIN — Medication 2 MILLILITER(S): at 20:22

## 2017-01-01 RX ADMIN — ALBUTEROL 1.25 MILLIGRAM(S): 90 AEROSOL, METERED ORAL at 08:28

## 2017-01-01 RX ADMIN — Medication 2 MILLILITER(S): at 20:13

## 2017-01-01 RX ADMIN — ALBUTEROL 1.25 MILLIGRAM(S): 90 AEROSOL, METERED ORAL at 20:47

## 2017-01-01 RX ADMIN — Medication 100 MILLIGRAM(S): at 05:50

## 2017-01-01 RX ADMIN — Medication 650 MILLIGRAM(S): at 00:00

## 2017-01-01 RX ADMIN — ALBUTEROL 1.25 MILLIGRAM(S): 90 AEROSOL, METERED ORAL at 03:25

## 2017-01-01 RX ADMIN — SENNA PLUS 2 TABLET(S): 8.6 TABLET ORAL at 00:45

## 2017-01-01 RX ADMIN — ALBUTEROL 1.25 MILLIGRAM(S): 90 AEROSOL, METERED ORAL at 16:37

## 2017-01-01 RX ADMIN — ALBUTEROL 1.25 MILLIGRAM(S): 90 AEROSOL, METERED ORAL at 08:37

## 2017-01-01 RX ADMIN — CARBIDOPA AND LEVODOPA 1 TABLET(S): 25; 100 TABLET ORAL at 23:03

## 2017-01-01 RX ADMIN — ALBUTEROL 1.25 MILLIGRAM(S): 90 AEROSOL, METERED ORAL at 08:08

## 2017-01-01 RX ADMIN — Medication 100 MILLIGRAM(S): at 13:01

## 2017-01-01 RX ADMIN — SODIUM CHLORIDE 100 MILLILITER(S): 9 INJECTION INTRAMUSCULAR; INTRAVENOUS; SUBCUTANEOUS at 18:50

## 2017-01-01 RX ADMIN — HEPARIN SODIUM 5000 UNIT(S): 5000 INJECTION INTRAVENOUS; SUBCUTANEOUS at 17:30

## 2017-01-01 RX ADMIN — Medication 1 SPRAY(S): at 17:35

## 2017-01-01 RX ADMIN — Medication 650 MILLIGRAM(S): at 18:35

## 2017-01-01 RX ADMIN — Medication 650 MILLIGRAM(S): at 00:30

## 2017-01-01 RX ADMIN — Medication 100 MILLIGRAM(S): at 06:01

## 2017-01-01 RX ADMIN — ALBUTEROL 1.25 MILLIGRAM(S): 90 AEROSOL, METERED ORAL at 21:24

## 2017-01-01 RX ADMIN — OXYCODONE HYDROCHLORIDE 5 MILLIGRAM(S): 5 TABLET ORAL at 00:55

## 2017-01-01 RX ADMIN — ALBUTEROL 1.25 MILLIGRAM(S): 90 AEROSOL, METERED ORAL at 20:22

## 2017-01-01 RX ADMIN — ALBUTEROL 1.25 MILLIGRAM(S): 90 AEROSOL, METERED ORAL at 03:49

## 2017-01-01 RX ADMIN — Medication 1 SPRAY(S): at 22:40

## 2017-01-01 RX ADMIN — Medication 650 MILLIGRAM(S): at 17:49

## 2017-01-01 RX ADMIN — SODIUM CHLORIDE 125 MILLILITER(S): 9 INJECTION, SOLUTION INTRAVENOUS at 21:00

## 2017-01-01 RX ADMIN — HEPARIN SODIUM 5000 UNIT(S): 5000 INJECTION INTRAVENOUS; SUBCUTANEOUS at 05:02

## 2017-01-01 RX ADMIN — Medication 1 SPRAY(S): at 13:14

## 2017-01-01 RX ADMIN — SODIUM CHLORIDE 1000 MILLILITER(S): 9 INJECTION INTRAMUSCULAR; INTRAVENOUS; SUBCUTANEOUS at 11:40

## 2017-01-01 RX ADMIN — CARBIDOPA AND LEVODOPA 1 TABLET(S): 25; 100 TABLET ORAL at 17:45

## 2017-01-01 RX ADMIN — Medication 40 MILLIEQUIVALENT(S): at 10:52

## 2017-01-01 RX ADMIN — CARBIDOPA AND LEVODOPA 1 TABLET(S): 25; 100 TABLET ORAL at 05:40

## 2017-01-01 RX ADMIN — Medication 1 SPRAY(S): at 21:07

## 2017-01-01 RX ADMIN — Medication 2 MILLILITER(S): at 08:32

## 2017-01-01 RX ADMIN — CARBIDOPA AND LEVODOPA 1 TABLET(S): 25; 100 TABLET ORAL at 00:49

## 2017-01-01 RX ADMIN — SODIUM CHLORIDE 100 MILLILITER(S): 9 INJECTION INTRAMUSCULAR; INTRAVENOUS; SUBCUTANEOUS at 19:44

## 2017-01-01 RX ADMIN — Medication 1 SPRAY(S): at 12:38

## 2017-01-01 RX ADMIN — ALBUTEROL 1.25 MILLIGRAM(S): 90 AEROSOL, METERED ORAL at 20:39

## 2017-01-01 RX ADMIN — Medication 100 MILLIGRAM(S): at 12:48

## 2017-01-01 RX ADMIN — SODIUM CHLORIDE 125 MILLILITER(S): 9 INJECTION, SOLUTION INTRAVENOUS at 16:16

## 2017-01-01 RX ADMIN — ALBUTEROL 1.25 MILLIGRAM(S): 90 AEROSOL, METERED ORAL at 03:46

## 2017-01-01 RX ADMIN — HEPARIN SODIUM 5000 UNIT(S): 5000 INJECTION INTRAVENOUS; SUBCUTANEOUS at 19:05

## 2017-01-01 RX ADMIN — Medication 650 MILLIGRAM(S): at 09:19

## 2017-01-01 RX ADMIN — Medication 1 SPRAY(S): at 14:41

## 2017-01-01 RX ADMIN — ALBUTEROL 1.25 MILLIGRAM(S): 90 AEROSOL, METERED ORAL at 09:14

## 2017-01-01 RX ADMIN — ALBUTEROL 1.25 MILLIGRAM(S): 90 AEROSOL, METERED ORAL at 15:21

## 2017-01-01 RX ADMIN — ALBUTEROL 1.25 MILLIGRAM(S): 90 AEROSOL, METERED ORAL at 15:24

## 2017-01-01 RX ADMIN — Medication 1 SPRAY(S): at 21:40

## 2017-01-01 RX ADMIN — Medication 2 MILLILITER(S): at 08:38

## 2017-01-01 RX ADMIN — ALBUTEROL 1.25 MILLIGRAM(S): 90 AEROSOL, METERED ORAL at 15:27

## 2017-01-01 RX ADMIN — ALBUTEROL 1.25 MILLIGRAM(S): 90 AEROSOL, METERED ORAL at 03:32

## 2017-01-01 RX ADMIN — HEPARIN SODIUM 5000 UNIT(S): 5000 INJECTION INTRAVENOUS; SUBCUTANEOUS at 06:06

## 2017-01-01 RX ADMIN — Medication 1 SPRAY(S): at 05:24

## 2017-01-01 RX ADMIN — SENNA PLUS 2 TABLET(S): 8.6 TABLET ORAL at 17:11

## 2017-01-01 RX ADMIN — Medication 1 SPRAY(S): at 05:58

## 2017-01-01 RX ADMIN — CARBIDOPA AND LEVODOPA 1 TABLET(S): 25; 100 TABLET ORAL at 12:16

## 2017-01-01 RX ADMIN — ALBUTEROL 1.25 MILLIGRAM(S): 90 AEROSOL, METERED ORAL at 20:11

## 2017-01-01 RX ADMIN — Medication 1 SPRAY(S): at 05:39

## 2017-01-01 RX ADMIN — Medication 100 MILLIGRAM(S): at 05:59

## 2017-01-01 RX ADMIN — ALBUTEROL 1.25 MILLIGRAM(S): 90 AEROSOL, METERED ORAL at 09:01

## 2017-01-01 RX ADMIN — Medication 1 SPRAY(S): at 22:07

## 2017-01-01 RX ADMIN — Medication 1 SPRAY(S): at 05:50

## 2017-01-01 RX ADMIN — HEPARIN SODIUM 5000 UNIT(S): 5000 INJECTION INTRAVENOUS; SUBCUTANEOUS at 05:40

## 2017-01-01 RX ADMIN — Medication 650 MILLIGRAM(S): at 10:05

## 2017-01-01 RX ADMIN — Medication 100 MILLIGRAM(S): at 06:26

## 2017-01-01 RX ADMIN — CARBIDOPA AND LEVODOPA 1 TABLET(S): 25; 100 TABLET ORAL at 05:25

## 2017-01-01 RX ADMIN — SODIUM CHLORIDE 1000 MILLILITER(S): 9 INJECTION INTRAMUSCULAR; INTRAVENOUS; SUBCUTANEOUS at 18:18

## 2017-01-01 RX ADMIN — OXYCODONE HYDROCHLORIDE 5 MILLIGRAM(S): 5 TABLET ORAL at 13:19

## 2017-01-01 RX ADMIN — CARBIDOPA AND LEVODOPA 1 TABLET(S): 25; 100 TABLET ORAL at 18:35

## 2017-01-01 RX ADMIN — SENNA PLUS 2 TABLET(S): 8.6 TABLET ORAL at 17:46

## 2017-01-01 RX ADMIN — Medication 1 SPRAY(S): at 21:22

## 2017-01-01 RX ADMIN — HEPARIN SODIUM 5000 UNIT(S): 5000 INJECTION INTRAVENOUS; SUBCUTANEOUS at 05:11

## 2017-01-01 RX ADMIN — Medication 100 MILLIGRAM(S): at 17:45

## 2017-01-01 RX ADMIN — CARBIDOPA AND LEVODOPA 1 TABLET(S): 25; 100 TABLET ORAL at 13:28

## 2017-01-01 RX ADMIN — SENNA PLUS 2 TABLET(S): 8.6 TABLET ORAL at 14:57

## 2017-01-01 RX ADMIN — ALBUTEROL 1.25 MILLIGRAM(S): 90 AEROSOL, METERED ORAL at 15:51

## 2017-01-01 RX ADMIN — ALBUTEROL 1.25 MILLIGRAM(S): 90 AEROSOL, METERED ORAL at 03:43

## 2017-01-01 RX ADMIN — ALBUTEROL 1.25 MILLIGRAM(S): 90 AEROSOL, METERED ORAL at 15:09

## 2017-01-01 RX ADMIN — ALBUTEROL 1.25 MILLIGRAM(S): 90 AEROSOL, METERED ORAL at 20:42

## 2017-01-01 RX ADMIN — HEPARIN SODIUM 5000 UNIT(S): 5000 INJECTION INTRAVENOUS; SUBCUTANEOUS at 05:25

## 2017-01-01 RX ADMIN — CARBIDOPA AND LEVODOPA 1 TABLET(S): 25; 100 TABLET ORAL at 00:45

## 2017-01-01 RX ADMIN — Medication 100 MILLIGRAM(S): at 05:40

## 2017-01-01 RX ADMIN — CARBIDOPA AND LEVODOPA 1 TABLET(S): 25; 100 TABLET ORAL at 00:01

## 2017-01-01 RX ADMIN — SODIUM CHLORIDE 1000 MILLILITER(S): 9 INJECTION INTRAMUSCULAR; INTRAVENOUS; SUBCUTANEOUS at 15:00

## 2017-01-01 RX ADMIN — ALBUTEROL 1.25 MILLIGRAM(S): 90 AEROSOL, METERED ORAL at 15:57

## 2017-01-01 RX ADMIN — SODIUM CHLORIDE 100 MILLILITER(S): 9 INJECTION INTRAMUSCULAR; INTRAVENOUS; SUBCUTANEOUS at 20:44

## 2017-01-01 RX ADMIN — SODIUM CHLORIDE 125 MILLILITER(S): 9 INJECTION, SOLUTION INTRAVENOUS at 05:44

## 2017-01-01 RX ADMIN — Medication 100 MILLIGRAM(S): at 05:25

## 2017-01-01 RX ADMIN — HEPARIN SODIUM 5000 UNIT(S): 5000 INJECTION INTRAVENOUS; SUBCUTANEOUS at 05:49

## 2017-01-01 RX ADMIN — Medication 1 SPRAY(S): at 15:00

## 2017-01-01 RX ADMIN — LACTULOSE 10 GRAM(S): 10 SOLUTION ORAL at 18:21

## 2017-01-01 RX ADMIN — CARBIDOPA AND LEVODOPA 1 TABLET(S): 25; 100 TABLET ORAL at 12:48

## 2017-01-01 RX ADMIN — HEPARIN SODIUM 5000 UNIT(S): 5000 INJECTION INTRAVENOUS; SUBCUTANEOUS at 17:33

## 2017-01-01 RX ADMIN — HEPARIN SODIUM 5000 UNIT(S): 5000 INJECTION INTRAVENOUS; SUBCUTANEOUS at 05:59

## 2017-01-01 RX ADMIN — Medication 100 MILLIGRAM(S): at 05:55

## 2017-01-01 RX ADMIN — Medication 1 SPRAY(S): at 14:30

## 2017-01-01 RX ADMIN — Medication 100 MILLIGRAM(S): at 18:35

## 2017-01-01 RX ADMIN — ALBUTEROL 1.25 MILLIGRAM(S): 90 AEROSOL, METERED ORAL at 03:54

## 2017-01-01 RX ADMIN — Medication 1 SPRAY(S): at 21:56

## 2017-01-01 RX ADMIN — HEPARIN SODIUM 5000 UNIT(S): 5000 INJECTION INTRAVENOUS; SUBCUTANEOUS at 17:37

## 2017-01-01 RX ADMIN — ALBUTEROL 1.25 MILLIGRAM(S): 90 AEROSOL, METERED ORAL at 08:27

## 2017-01-01 RX ADMIN — CARBIDOPA AND LEVODOPA 1 TABLET(S): 25; 100 TABLET ORAL at 13:07

## 2017-01-01 RX ADMIN — HEPARIN SODIUM 5000 UNIT(S): 5000 INJECTION INTRAVENOUS; SUBCUTANEOUS at 05:09

## 2017-01-01 RX ADMIN — ALBUTEROL 1.25 MILLIGRAM(S): 90 AEROSOL, METERED ORAL at 20:54

## 2017-01-01 RX ADMIN — Medication 2 MILLILITER(S): at 14:53

## 2017-01-01 RX ADMIN — OXYCODONE HYDROCHLORIDE 5 MILLIGRAM(S): 5 TABLET ORAL at 14:05

## 2017-01-01 RX ADMIN — SODIUM CHLORIDE 75 MILLILITER(S): 9 INJECTION, SOLUTION INTRAVENOUS at 22:03

## 2017-01-01 RX ADMIN — HEPARIN SODIUM 5000 UNIT(S): 5000 INJECTION INTRAVENOUS; SUBCUTANEOUS at 05:08

## 2017-01-01 RX ADMIN — HEPARIN SODIUM 5000 UNIT(S): 5000 INJECTION INTRAVENOUS; SUBCUTANEOUS at 16:16

## 2017-01-01 RX ADMIN — Medication 100 MILLIGRAM(S): at 00:45

## 2017-01-01 RX ADMIN — ALBUTEROL 1.25 MILLIGRAM(S): 90 AEROSOL, METERED ORAL at 14:49

## 2017-01-01 RX ADMIN — HEPARIN SODIUM 5000 UNIT(S): 5000 INJECTION INTRAVENOUS; SUBCUTANEOUS at 05:37

## 2017-01-01 RX ADMIN — ALBUTEROL 1.25 MILLIGRAM(S): 90 AEROSOL, METERED ORAL at 20:08

## 2017-01-01 RX ADMIN — Medication 0.98 MICROGRAM(S)/KG/MIN: at 17:58

## 2017-01-01 RX ADMIN — SENNA PLUS 2 TABLET(S): 8.6 TABLET ORAL at 17:36

## 2017-01-01 RX ADMIN — HEPARIN SODIUM 5000 UNIT(S): 5000 INJECTION INTRAVENOUS; SUBCUTANEOUS at 17:36

## 2017-01-01 RX ADMIN — CARBIDOPA AND LEVODOPA 1 TABLET(S): 25; 100 TABLET ORAL at 05:55

## 2017-01-01 RX ADMIN — CARBIDOPA AND LEVODOPA 1 TABLET(S): 25; 100 TABLET ORAL at 23:45

## 2017-01-01 RX ADMIN — CARBIDOPA AND LEVODOPA 1 TABLET(S): 25; 100 TABLET ORAL at 11:16

## 2017-01-01 RX ADMIN — CARBIDOPA AND LEVODOPA 1 TABLET(S): 25; 100 TABLET ORAL at 11:05

## 2017-01-01 RX ADMIN — CARBIDOPA AND LEVODOPA 1 TABLET(S): 25; 100 TABLET ORAL at 16:15

## 2017-01-01 RX ADMIN — ALBUTEROL 1.25 MILLIGRAM(S): 90 AEROSOL, METERED ORAL at 08:32

## 2017-01-01 RX ADMIN — OXYCODONE HYDROCHLORIDE 5 MILLIGRAM(S): 5 TABLET ORAL at 00:56

## 2017-01-01 RX ADMIN — OXYCODONE HYDROCHLORIDE 5 MILLIGRAM(S): 5 TABLET ORAL at 22:03

## 2017-01-01 RX ADMIN — ALBUTEROL 1.25 MILLIGRAM(S): 90 AEROSOL, METERED ORAL at 15:32

## 2017-01-01 RX ADMIN — MORPHINE SULFATE 2 MILLIGRAM(S): 50 CAPSULE, EXTENDED RELEASE ORAL at 12:48

## 2017-01-01 RX ADMIN — CARBIDOPA AND LEVODOPA 1 TABLET(S): 25; 100 TABLET ORAL at 05:48

## 2017-01-01 RX ADMIN — SODIUM CHLORIDE 75 MILLILITER(S): 9 INJECTION, SOLUTION INTRAVENOUS at 14:29

## 2017-01-01 RX ADMIN — ALBUTEROL 1.25 MILLIGRAM(S): 90 AEROSOL, METERED ORAL at 21:17

## 2017-01-01 RX ADMIN — ALBUTEROL 1.25 MILLIGRAM(S): 90 AEROSOL, METERED ORAL at 21:14

## 2017-01-01 RX ADMIN — Medication 3 MILLILITER(S): at 14:36

## 2017-01-01 RX ADMIN — CARBIDOPA AND LEVODOPA 1 TABLET(S): 25; 100 TABLET ORAL at 13:01

## 2017-01-01 RX ADMIN — ALBUTEROL 1.25 MILLIGRAM(S): 90 AEROSOL, METERED ORAL at 14:35

## 2017-01-01 RX ADMIN — ALBUTEROL 1.25 MILLIGRAM(S): 90 AEROSOL, METERED ORAL at 08:35

## 2017-01-01 RX ADMIN — CARBIDOPA AND LEVODOPA 1 TABLET(S): 25; 100 TABLET ORAL at 05:07

## 2017-01-01 RX ADMIN — ALBUTEROL 1.25 MILLIGRAM(S): 90 AEROSOL, METERED ORAL at 08:59

## 2017-01-01 RX ADMIN — SODIUM CHLORIDE 1000 MILLILITER(S): 9 INJECTION INTRAMUSCULAR; INTRAVENOUS; SUBCUTANEOUS at 14:40

## 2017-01-01 RX ADMIN — Medication 100 MILLIGRAM(S): at 13:28

## 2017-01-01 RX ADMIN — OXYCODONE HYDROCHLORIDE 5 MILLIGRAM(S): 5 TABLET ORAL at 23:55

## 2017-01-01 RX ADMIN — Medication 1 SPRAY(S): at 14:55

## 2017-01-01 RX ADMIN — Medication 100 MILLIGRAM(S): at 05:02

## 2017-01-01 RX ADMIN — HEPARIN SODIUM 5000 UNIT(S): 5000 INJECTION INTRAVENOUS; SUBCUTANEOUS at 17:59

## 2017-01-01 RX ADMIN — SENNA PLUS 2 TABLET(S): 8.6 TABLET ORAL at 05:05

## 2017-01-01 RX ADMIN — Medication 100 MILLIGRAM(S): at 10:24

## 2017-01-01 RX ADMIN — LACTULOSE 10 GRAM(S): 10 SOLUTION ORAL at 11:16

## 2017-01-01 RX ADMIN — SENNA PLUS 2 TABLET(S): 8.6 TABLET ORAL at 05:12

## 2017-01-01 RX ADMIN — CARBIDOPA AND LEVODOPA 1 TABLET(S): 25; 100 TABLET ORAL at 17:36

## 2017-01-01 RX ADMIN — CARBIDOPA AND LEVODOPA 1 TABLET(S): 25; 100 TABLET ORAL at 23:55

## 2017-01-01 RX ADMIN — CARBIDOPA AND LEVODOPA 1 TABLET(S): 25; 100 TABLET ORAL at 13:15

## 2017-01-01 RX ADMIN — SENNA PLUS 2 TABLET(S): 8.6 TABLET ORAL at 05:25

## 2017-01-01 RX ADMIN — ALBUTEROL 1.25 MILLIGRAM(S): 90 AEROSOL, METERED ORAL at 03:29

## 2017-01-01 RX ADMIN — ALBUTEROL 1.25 MILLIGRAM(S): 90 AEROSOL, METERED ORAL at 10:41

## 2017-01-01 RX ADMIN — ALBUTEROL 1.25 MILLIGRAM(S): 90 AEROSOL, METERED ORAL at 02:50

## 2017-01-01 RX ADMIN — Medication 40 MILLIEQUIVALENT(S): at 14:58

## 2017-01-01 RX ADMIN — CARBIDOPA AND LEVODOPA 1 TABLET(S): 25; 100 TABLET ORAL at 06:01

## 2017-01-01 RX ADMIN — HEPARIN SODIUM 5000 UNIT(S): 5000 INJECTION INTRAVENOUS; SUBCUTANEOUS at 18:21

## 2017-01-01 RX ADMIN — HEPARIN SODIUM 5000 UNIT(S): 5000 INJECTION INTRAVENOUS; SUBCUTANEOUS at 17:14

## 2017-01-01 RX ADMIN — CARBIDOPA AND LEVODOPA 1 TABLET(S): 25; 100 TABLET ORAL at 05:59

## 2017-01-01 RX ADMIN — SODIUM CHLORIDE 125 MILLILITER(S): 9 INJECTION, SOLUTION INTRAVENOUS at 17:36

## 2017-01-01 RX ADMIN — CARBIDOPA AND LEVODOPA 1 TABLET(S): 25; 100 TABLET ORAL at 00:23

## 2017-01-01 RX ADMIN — Medication 100 MILLIGRAM(S): at 17:36

## 2017-01-01 RX ADMIN — SENNA PLUS 2 TABLET(S): 8.6 TABLET ORAL at 06:26

## 2017-01-01 RX ADMIN — CARBIDOPA AND LEVODOPA 1 TABLET(S): 25; 100 TABLET ORAL at 02:00

## 2017-01-01 RX ADMIN — Medication 100 MILLIGRAM(S): at 18:20

## 2017-01-01 RX ADMIN — CARBIDOPA AND LEVODOPA 1 TABLET(S): 25; 100 TABLET ORAL at 05:06

## 2017-01-01 RX ADMIN — ALBUTEROL 1.25 MILLIGRAM(S): 90 AEROSOL, METERED ORAL at 02:31

## 2017-01-01 RX ADMIN — HEPARIN SODIUM 5000 UNIT(S): 5000 INJECTION INTRAVENOUS; SUBCUTANEOUS at 06:27

## 2017-01-01 RX ADMIN — ALBUTEROL 1.25 MILLIGRAM(S): 90 AEROSOL, METERED ORAL at 15:49

## 2017-01-01 RX ADMIN — CARBIDOPA AND LEVODOPA 1 TABLET(S): 25; 100 TABLET ORAL at 00:00

## 2017-01-01 RX ADMIN — ALBUTEROL 1.25 MILLIGRAM(S): 90 AEROSOL, METERED ORAL at 15:16

## 2017-01-01 RX ADMIN — ALBUTEROL 1.25 MILLIGRAM(S): 90 AEROSOL, METERED ORAL at 04:06

## 2017-01-01 RX ADMIN — SENNA PLUS 2 TABLET(S): 8.6 TABLET ORAL at 18:21

## 2017-01-01 RX ADMIN — Medication 1 SPRAY(S): at 13:01

## 2017-01-01 RX ADMIN — Medication 2 MILLILITER(S): at 03:32

## 2017-01-01 RX ADMIN — ALBUTEROL 1.25 MILLIGRAM(S): 90 AEROSOL, METERED ORAL at 21:26

## 2017-01-01 RX ADMIN — Medication 2 MILLILITER(S): at 08:30

## 2017-01-01 RX ADMIN — ALBUTEROL 1.25 MILLIGRAM(S): 90 AEROSOL, METERED ORAL at 20:43

## 2017-01-01 RX ADMIN — Medication 1 SPRAY(S): at 13:29

## 2017-01-01 RX ADMIN — CARBIDOPA AND LEVODOPA 1 TABLET(S): 25; 100 TABLET ORAL at 05:50

## 2017-01-01 RX ADMIN — Medication 250 MILLIGRAM(S): at 12:40

## 2017-01-01 RX ADMIN — SODIUM CHLORIDE 1000 MILLILITER(S): 9 INJECTION INTRAMUSCULAR; INTRAVENOUS; SUBCUTANEOUS at 15:24

## 2017-01-01 RX ADMIN — CARBIDOPA AND LEVODOPA 1 TABLET(S): 25; 100 TABLET ORAL at 17:46

## 2017-01-01 RX ADMIN — CARBIDOPA AND LEVODOPA 1 TABLET(S): 25; 100 TABLET ORAL at 05:03

## 2017-01-01 RX ADMIN — CARBIDOPA AND LEVODOPA 1 TABLET(S): 25; 100 TABLET ORAL at 12:39

## 2017-01-01 RX ADMIN — SENNA PLUS 2 TABLET(S): 8.6 TABLET ORAL at 06:01

## 2017-01-01 RX ADMIN — Medication 100 MILLIGRAM(S): at 05:06

## 2017-01-01 RX ADMIN — CARBIDOPA AND LEVODOPA 1 TABLET(S): 25; 100 TABLET ORAL at 17:37

## 2017-01-01 RX ADMIN — HEPARIN SODIUM 5000 UNIT(S): 5000 INJECTION INTRAVENOUS; SUBCUTANEOUS at 17:35

## 2017-01-01 RX ADMIN — Medication 100 MILLIGRAM(S): at 05:48

## 2017-01-01 RX ADMIN — ALBUTEROL 1.25 MILLIGRAM(S): 90 AEROSOL, METERED ORAL at 20:53

## 2017-01-01 RX ADMIN — CARBIDOPA AND LEVODOPA 1 TABLET(S): 25; 100 TABLET ORAL at 18:00

## 2017-01-01 RX ADMIN — HEPARIN SODIUM 5000 UNIT(S): 5000 INJECTION INTRAVENOUS; SUBCUTANEOUS at 18:35

## 2017-01-01 RX ADMIN — PIPERACILLIN AND TAZOBACTAM 200 GRAM(S): 4; .5 INJECTION, POWDER, LYOPHILIZED, FOR SOLUTION INTRAVENOUS at 11:40

## 2017-01-01 RX ADMIN — Medication 100 MILLIGRAM(S): at 16:15

## 2017-01-01 RX ADMIN — Medication 1 SPRAY(S): at 21:27

## 2017-01-01 RX ADMIN — LACTULOSE 10 GRAM(S): 10 SOLUTION ORAL at 00:47

## 2017-01-01 RX ADMIN — SENNA PLUS 2 TABLET(S): 8.6 TABLET ORAL at 17:45

## 2017-01-01 RX ADMIN — SENNA PLUS 2 TABLET(S): 8.6 TABLET ORAL at 05:50

## 2017-01-01 RX ADMIN — CARBIDOPA AND LEVODOPA 1 TABLET(S): 25; 100 TABLET ORAL at 17:11

## 2017-01-01 RX ADMIN — Medication 1 SPRAY(S): at 05:09

## 2017-01-01 RX ADMIN — LACTULOSE 10 GRAM(S): 10 SOLUTION ORAL at 14:41

## 2017-01-01 RX ADMIN — Medication 100 MILLIGRAM(S): at 13:35

## 2017-01-01 RX ADMIN — ALBUTEROL 1.25 MILLIGRAM(S): 90 AEROSOL, METERED ORAL at 03:13

## 2017-01-01 RX ADMIN — ALBUTEROL 1.25 MILLIGRAM(S): 90 AEROSOL, METERED ORAL at 15:41

## 2017-01-01 RX ADMIN — CARBIDOPA AND LEVODOPA 1 TABLET(S): 25; 100 TABLET ORAL at 23:42

## 2017-01-01 RX ADMIN — SENNA PLUS 2 TABLET(S): 8.6 TABLET ORAL at 05:40

## 2017-01-01 RX ADMIN — HEPARIN SODIUM 5000 UNIT(S): 5000 INJECTION INTRAVENOUS; SUBCUTANEOUS at 17:11

## 2017-01-01 RX ADMIN — ALBUTEROL 1.25 MILLIGRAM(S): 90 AEROSOL, METERED ORAL at 21:03

## 2017-01-01 RX ADMIN — SODIUM CHLORIDE 75 MILLILITER(S): 9 INJECTION, SOLUTION INTRAVENOUS at 05:03

## 2017-01-01 RX ADMIN — Medication 1 SPRAY(S): at 05:35

## 2017-01-01 RX ADMIN — Medication 2 MILLILITER(S): at 21:14

## 2017-01-25 NOTE — H&P ADULT. - HISTORY OF PRESENT ILLNESS
71 years old male with PMH of Parkinsonism, total care dependent brought in to the ER for the evaluation of intermittent fever for the past 2 days associated with congestion and  thick secretions. The daughter states patient has difficulty and swallowing and his PO intake is minimal for the past few days. No chest pain, SOB, abdominal pain, diarrhea or constipation.

## 2017-01-25 NOTE — ED ADULT TRIAGE NOTE - CHIEF COMPLAINT QUOTE
pt brought to ER by daughter for fevers on and off and decreased ability to swallow since Sunday. daughter states pt has advanced parkinson's and she is afraid he is going to aspirate. she also states pt has decreased po intake

## 2017-01-25 NOTE — H&P ADULT. - PROBLEM SELECTOR PLAN 1
no fever, normal wbc count.  CXR reviewed, no infiltrates  influenza screening.  started on mucinex albuterol  hold off antibiotics for now.

## 2017-01-25 NOTE — ED PROVIDER NOTE - CARE PLAN
Principal Discharge DX:	Parkinson disease Principal Discharge DX:	Aspiration into airway, initial encounter  Secondary Diagnosis:	Parkinsons

## 2017-01-25 NOTE — ED PROVIDER NOTE - MEDICAL DECISION MAKING DETAILS
parkinsons disease with decrease po intake x 3 days ; family concerned with patient ingesting food and liquids

## 2017-01-25 NOTE — H&P ADULT. - ASSESSMENT
71 years old male with PMH of Parkinsonism admitted with 2-3 days history of intermittent fever, congestion decreased Po intake and difficulty in swallowing.

## 2017-01-25 NOTE — PATIENT PROFILE ADULT. - HEALTHCARE QUESTIONS, PROFILE
Daughter is unsure whether to place patient in short or long term placement, would like to speak with social work

## 2017-01-26 NOTE — PROGRESS NOTE ADULT - ASSESSMENT
1) Toxic metabolic encephalopathy --> likely secondary to viral syndrome  --> unclear why urine culture and blood culture not ordered  --> fluids, hold off on abx  --> supportive care    2) Viral syndrome --> rhinovirus  --> supportive care    3) Parkinsons --> too lethargic to take sinemet and amantadine   --> will place ng tube tomorrow if needed for meds and tube feeds    4) dvt prop --> ep sub q    FULL CODE

## 2017-01-26 NOTE — PROGRESS NOTE ADULT - SUBJECTIVE AND OBJECTIVE BOX
JEANCLAUDE MARS    [id number]    71y      Male    INTERVAL HPI/OVERNIGHT EVENTS:    patient being seen for viral syndrome, toxic metabolic encephalopathy and parkinson's. Patient seen at bedside with daughter who is a nurse. Patient still lethargic appearing.     last 24 hours patient is afebrile but lactic was elevated.       REVIEW OF SYSTEMS:    unable to obtain secondary to mental status.     Vital Signs Last 24 Hrs  T(C): 36.8, Max: 36.8 ( @ 11:35)  T(F): 98.3, Max: 98.3 ( @ 11:35)  HR: 116 (78 - 120)  BP: 112/55 (112/55 - 134/72)  BP(mean): 98 (98 - 98)  RR: 20 (18 - 20)  SpO2: 100% (91% - 100%)    PHYSICAL EXAM:    GENERAL: NAD, cachetic,   HEENT: temporal wasting, dry MM   NECK: soft,   CHEST/LUNG: bibasilar crackles,   HEART: S1S2+, Regular rate and rhythm; No murmurs  ABDOMEN: Soft, Nontender, Nondistended;  EXTREMITIES:  2+ Peripheral Pulses, No edema  SKIN: No rashes or lesions  NEURO: AAOX0,       LABS:                        16.2   8.04  )-----------( 200      ( 2017 14:20 )             47.4     2017 07:39    144    |  108    |  20.0   ----------------------------<  100    5.1     |  19.0   |  0.83     Ca    8.9        2017 07:39    TPro  8.3    /  Alb  4.0    /  TBili  0.5    /  DBili  x      /  AST  42     /  ALT  39     /  AlkPhos  246    2017 14:20      Urinalysis Basic - ( 2017 15:50 )    Color: Yellow / Appearance: Clear / S.020 / pH: x  Gluc: x / Ketone: Negative  / Bili: Negative / Urobili: Negative mg/dL   Blood: x / Protein: Negative mg/dL / Nitrite: Negative   Leuk Esterase: Negative / RBC: 3-5 /HPF / WBC x   Sq Epi: x / Non Sq Epi: x / Bacteria: x      MEDICATIONS  (STANDING):  carbidopa/levodopa  25/100 1Tablet(s) Oral four times a day  amantadine 100milliGRAM(s) Oral two times a day  ALBUTerol   0.042% 1.25milliGRAM(s) Nebulizer every 6 hours  sodium chloride 0.9%. 1000milliLiter(s) IV Continuous <Continuous>  heparin  Injectable 5000Unit(s) SubCutaneous every 12 hours  influenza   Vaccine 0.5milliLiter(s) IntraMuscular once  sodium chloride 0.9%. 1000milliLiter(s) IV Continuous <Continuous>    MEDICATIONS  (PRN):      RADIOLOGY & ADDITIONAL TESTS:    urine culture --> ordered    blood culture --> ordered

## 2017-01-27 NOTE — PROGRESS NOTE ADULT - SUBJECTIVE AND OBJECTIVE BOX
JEANCLAUDE MARS    [id number]    71y      Male    INTERVAL HPI/OVERNIGHT EVENTS:    patient being seen for viral syndrome, toxic metabolic encephalopathy and parkinson's. Patient seen at bedside with daughter. Patient is still lethargic appearing and not responsive.     last 24 hours patient was afebrile      REVIEW OF SYSTEMS:    unable to obtain secondary to mental status     Vital Signs Last 24 Hrs  T(C): 37.1, Max: 37.1 ( @ 09:56)  T(F): 98.7, Max: 98.7 ( @ 09:56)  HR: 110 (95 - 120)  BP: 149/88 (112/55 - 149/88)  BP(mean): --  RR: 18 (18 - 20)  SpO2: 91% (91% - 100%)    PHYSICAL EXAM:    GENERAL: NAD, cachetic,   HEENT: temporal wasting, dry MM   NECK: soft,   CHEST/LUNG: bibasilar crackles,   HEART: S1S2+, Regular rate and rhythm; No murmurs  ABDOMEN: Soft, Nontender, Nondistended;  EXTREMITIES:  2+ Peripheral Pulses, No edema  SKIN: No rashes or lesions  NEURO: AAOX0,     LABS:                        13.0   6.53  )-----------( 172      ( 2017 09:01 )             38.0     2017 09:01    148    |  113    |  25.0   ----------------------------<  78     4.4     |  23.0   |  0.93     Ca    9.3        2017 09:01    TPro  8.3    /  Alb  4.0    /  TBili  0.5    /  DBili  x      /  AST  42     /  ALT  39     /  AlkPhos  246    2017 14:20      Urinalysis Basic - ( 2017 15:50 )    Color: Yellow / Appearance: Clear / S.020 / pH: x  Gluc: x / Ketone: Negative  / Bili: Negative / Urobili: Negative mg/dL   Blood: x / Protein: Negative mg/dL / Nitrite: Negative   Leuk Esterase: Negative / RBC: 3-5 /HPF / WBC x   Sq Epi: x / Non Sq Epi: x / Bacteria: x      MEDICATIONS  (STANDING):  carbidopa/levodopa  25/100 1Tablet(s) Oral four times a day  amantadine 100milliGRAM(s) Oral two times a day  ALBUTerol   0.042% 1.25milliGRAM(s) Nebulizer every 6 hours  heparin  Injectable 5000Unit(s) SubCutaneous every 12 hours  influenza   Vaccine 0.5milliLiter(s) IntraMuscular once  dextrose 5% 1000milliLiter(s) IV Continuous <Continuous>    MEDICATIONS  (PRN):  acetaminophen  Suppository 650milliGRAM(s) Rectal every 6 hours PRN For Temp greater than 38 C (100.4 F)      RADIOLOGY & ADDITIONAL TESTS:    blood culture --> in lab    urine culture --> in lab JEANCLAUDE MARS    [id number]    71y      Male    INTERVAL HPI/OVERNIGHT EVENTS:    patient being seen for viral syndrome, toxic metabolic encephalopathy and parkinson's. Patient seen at bedside with daughter. Patient is still lethargic appearing and not responsive but is awake.     last 24 hours patient was afebrile      REVIEW OF SYSTEMS:    unable to obtain secondary to mental status     Vital Signs Last 24 Hrs  T(C): 37.1, Max: 37.1 ( @ 09:56)  T(F): 98.7, Max: 98.7 ( @ 09:56)  HR: 110 (95 - 120)  BP: 149/88 (112/55 - 149/88)  BP(mean): --  RR: 18 (18 - 20)  SpO2: 91% (91% - 100%)    PHYSICAL EXAM:    GENERAL: NAD, cachetic,   HEENT: temporal wasting, dry MM   NECK: soft,   CHEST/LUNG: bibasilar crackles,   HEART: S1S2+, Regular rate and rhythm; No murmurs  ABDOMEN: Soft, Nontender, Nondistended;  EXTREMITIES:  2+ Peripheral Pulses, No edema  SKIN: No rashes or lesions  NEURO: AAOX0,     LABS:                        13.0   6.53  )-----------( 172      ( 2017 09:01 )             38.0     2017 09:01    148    |  113    |  25.0   ----------------------------<  78     4.4     |  23.0   |  0.93     Ca    9.3        2017 09:01    TPro  8.3    /  Alb  4.0    /  TBili  0.5    /  DBili  x      /  AST  42     /  ALT  39     /  AlkPhos  246    2017 14:20      Urinalysis Basic - ( 2017 15:50 )    Color: Yellow / Appearance: Clear / S.020 / pH: x  Gluc: x / Ketone: Negative  / Bili: Negative / Urobili: Negative mg/dL   Blood: x / Protein: Negative mg/dL / Nitrite: Negative   Leuk Esterase: Negative / RBC: 3-5 /HPF / WBC x   Sq Epi: x / Non Sq Epi: x / Bacteria: x      MEDICATIONS  (STANDING):  carbidopa/levodopa  25/100 1Tablet(s) Oral four times a day  amantadine 100milliGRAM(s) Oral two times a day  ALBUTerol   0.042% 1.25milliGRAM(s) Nebulizer every 6 hours  heparin  Injectable 5000Unit(s) SubCutaneous every 12 hours  influenza   Vaccine 0.5milliLiter(s) IntraMuscular once  dextrose 5% 1000milliLiter(s) IV Continuous <Continuous>    MEDICATIONS  (PRN):  acetaminophen  Suppository 650milliGRAM(s) Rectal every 6 hours PRN For Temp greater than 38 C (100.4 F)      RADIOLOGY & ADDITIONAL TESTS:    blood culture --> in lab    urine culture --> in lab

## 2017-01-27 NOTE — SWALLOW BEDSIDE ASSESSMENT ADULT - PHARYNGEAL PHASE
Unable to assess 2* no swallow elicited, therefore, bolus suctioned from oral cavity via [place] by therapist.

## 2017-01-27 NOTE — SWALLOW BEDSIDE ASSESSMENT ADULT - ORAL PHASE
Lingual stasis/Stasis in anterior sulcus/Decreased anterior-posterior movement of the bolus/Delayed oral transit time/Absent bolus manipulation, no swallow elicited, therefore, bolus suctioned from oral cavity via [place] by therapist

## 2017-01-27 NOTE — SWALLOW BEDSIDE ASSESSMENT ADULT - SLP GENERAL OBSERVATIONS
Lethargic, arousal improved with cues, reduced command following however attempts to follow noted, open mouth posture, +audible upper airway breath sounds, daughter & son in law at bedside, daughter suctioning pt with Phi Lethargic, arousal improved with cues, non verbal, reduced command following however attempts to follow noted, open mouth posture, +audible upper airway breath sounds, daughter & son in law at bedside, daughter suctioning pt with Phi

## 2017-01-27 NOTE — PROGRESS NOTE ADULT - ASSESSMENT
1) Toxic metabolic encephalopathy --> likely secondary to viral syndrome  --> follow up urine and blood culture  --> fc/w fluids  --> supportive care  --> no abx today     2) Viral syndrome --> rhinovirus  --> supportive care    3) Parkinsons --> too lethargic to take sinemet and amantadine   --> attempted NG tube but was unsuccessful     4) dvt prop --> jhep sub q 1) Toxic metabolic encephalopathy --> likely secondary to viral syndrome  --> follow up urine and blood culture  --> c/w fluids  --> supportive care  --> no abx    2) Viral syndrome --> rhinovirus  --> supportive care    3) Parkinsons --> too lethargic to take sinemet and amantadine   --> attempted NG tube but was unsuccessful   --> will attempt later    4) dvt prop --> jhep sub q    5) dysphagia --> will call back speech     full code

## 2017-01-27 NOTE — DIETITIAN INITIAL EVALUATION ADULT. - FACTORS AFF FOOD INTAKE
difficulty swallowing/lethargic, stage IV Parkinsons disease/change in mental status/difficulty chewing

## 2017-01-27 NOTE — SWALLOW BEDSIDE ASSESSMENT ADULT - SLP PERTINENT HISTORY OF CURRENT PROBLEM
hx Parkinson's disease, per daughter pt noted becoming increasingly lethargic with poor secretion management, unsuccessful attempt at NGT placement today.

## 2017-01-28 NOTE — PROGRESS NOTE ADULT - SUBJECTIVE AND OBJECTIVE BOX
JEANCLAUDE MARS    [id number]    71y      Male    INTERVAL HPI/OVERNIGHT EVENTS:    patient being seen for viral syndrome, toxic metabolic encephalopathy and parkinson's. Patient seen at bedside and is still lethargic and unresponsive. Patients mental status has not improved.     last 24 hours patients tmax is 100.1. NG tube was attempted twice yesterday     REVIEW OF SYSTEMS:    unable to obtain secondary to mental status       Vital Signs Last 24 Hrs  T(C): 37.8, Max: 37.8 (01-28 @ 09:03)  T(F): 100.1, Max: 100.1 (01-28 @ 09:03)  HR: 105 (89 - 105)  BP: 139/75 (127/73 - 139/75)  BP(mean): --  RR: 14 (14 - 18)  SpO2: 94% (94% - 96%)    PHYSICAL EXAM:    GENERAL: NAD, cachetic,   HEENT: temporal wasting, dry MM   NECK: soft,   CHEST/LUNG: improved aeration   HEART: S1S2+, Regular rate and rhythm; No murmurs  ABDOMEN: Soft, Nontender, Nondistended;  EXTREMITIES:  2+ Peripheral Pulses, No edema  SKIN: No rashes or lesions  NEURO: AAOX0,         LABS:                        11.9   6.66  )-----------( 173      ( 28 Jan 2017 10:17 )             34.5     28 Jan 2017 10:17    140    |  106    |  21.0   ----------------------------<  140    3.7     |  24.0   |  0.85     Ca    9.0        28 Jan 2017 10:17      MEDICATIONS  (STANDING):  carbidopa/levodopa  25/100 1Tablet(s) Oral four times a day  amantadine 100milliGRAM(s) Oral two times a day  ALBUTerol   0.042% 1.25milliGRAM(s) Nebulizer every 6 hours  heparin  Injectable 5000Unit(s) SubCutaneous every 12 hours  influenza   Vaccine 0.5milliLiter(s) IntraMuscular once  dextrose 5% 1000milliLiter(s) IV Continuous <Continuous>  acetylcysteine 20% Inhalation 2milliLiter(s) Inhalation every 12 hours    MEDICATIONS  (PRN):  acetaminophen  Suppository 650milliGRAM(s) Rectal every 6 hours PRN For Temp greater than 38 C (100.4 F)      RADIOLOGY & ADDITIONAL TESTS:    urine culture --> negative

## 2017-01-28 NOTE — PROGRESS NOTE ADULT - ASSESSMENT
1) Toxic metabolic encephalopathy --> likely secondary to viral syndrome  --> urine culture negative  --> c/w fluids  --> supportive care  --> no abx    2) Viral syndrome --> rhinovirus  --> supportive care    3) Parkinsons --> will resume home parkinsons meds once ng tube placement is confirmed  --> NG tube successfully placed today     4) dvt prop --> hep sub q    5) dysphagia --> failing swallow eval    6) hypernatremia --> resolved on d5w  --> bmp daily     full code  palliative consult placed

## 2017-01-28 NOTE — CHART NOTE - NSCHARTNOTEFT_GEN_A_CORE
NG tube placement    Medicine PA was called to place an NG tube. Supplies gathered [facility] NG tube, tape, lubricant, syringe. The NG tube was measured and marked at an appropriate length for the patient. The patient was positioned in an upright position. The tip of the tube was lubricated and it was placed into the right nostril. It was advanced slowly meeting some resistance at times, but overall advanced smoothly. It was advanced to the spot it was marked during the measurement. It was secured using tape. Appropriate gastric contents were aspirated with the syringe and positive gastric air bubble was heard with a stethoscope. The patient was observed for any coughing or choking and did not display any. The patient tolerated the procedure well.

## 2017-01-29 NOTE — PROGRESS NOTE ADULT - SUBJECTIVE AND OBJECTIVE BOX
JEANCLAUDE MARS    [id number]    71y      Male    INTERVAL HPI/OVERNIGHT EVENTS:    patient being seen for viral syndrome, toxic metabolic encephalopathy, possible pneumonia and parkinson's.     last 24 hours ng tube was placed and confirmed. Patient also seen by speech who recommends npo. Patient seen at bedside and clinical status is unchanged.     last 24 hours patient is afebrile.     REVIEW OF SYSTEMS:    unable to obtain     Vital Signs Last 24 Hrs  T(C): 36.6, Max: 37.3 (01-28 @ 15:19)  T(F): 97.9, Max: 99.1 (01-28 @ 15:19)  HR: 98 (96 - 101)  BP: 119/65 (119/65 - 130/70)  BP(mean): --  RR: 16 (16 - 16)  SpO2: 96% (94% - 100%)    PHYSICAL EXAM:    GENERAL: NAD, cachetic, contracted  HEENT: temporal wasting, dry MM   NECK: soft,   CHEST/LUNG: improved aeration   HEART: S1S2+, Regular rate and rhythm; No murmurs  ABDOMEN: Soft, Nontender, Nondistended;  EXTREMITIES:  2+ Peripheral Pulses, No edema  SKIN: No rashes or lesions  NEURO: AAOX0,       LABS:                        11.1   7.33  )-----------( 151      ( 29 Jan 2017 06:21 )             32.3     29 Jan 2017 06:21    138    |  102    |  19.0   ----------------------------<  118    3.6     |  24.0   |  0.89     Ca    8.6        29 Jan 2017 06:21      MEDICATIONS  (STANDING):  carbidopa/levodopa  25/100 1Tablet(s) Oral four times a day  amantadine 100milliGRAM(s) Oral two times a day  ALBUTerol   0.042% 1.25milliGRAM(s) Nebulizer every 6 hours  heparin  Injectable 5000Unit(s) SubCutaneous every 12 hours  influenza   Vaccine 0.5milliLiter(s) IntraMuscular once  acetylcysteine 20% Inhalation 2milliLiter(s) Inhalation every 12 hours  levoFLOXacin IVPB  IV Intermittent   levoFLOXacin IVPB 500milliGRAM(s) IV Intermittent every 24 hours    MEDICATIONS  (PRN):  acetaminophen  Suppository 650milliGRAM(s) Rectal every 6 hours PRN For Temp greater than 38 C (100.4 F)      RADIOLOGY & ADDITIONAL TESTS:

## 2017-01-29 NOTE — CHART NOTE - NSCHARTNOTEFT_GEN_A_CORE
Upon Nutritional Assessment by the Registered Dietitian your patient was determined to meet criteria / has evidence of the following diagnosis/diagnoses:          [ ]  Mild Protein Calorie Malnutrition        [ ]  Moderate Protein Calorie Malnutrition        [ X] Severe Protein Calorie Malnutrition        [ ] Unspecified Protein Calorie Malnutrition        [ ] Underweight / BMI <19        [ ] Morbid Obesity / BMI > 40      Findings as based on:  •  Comprehensive nutrition assessment and consultation  •  Calorie counts (nutrient intake analysis)  •  Food acceptance and intake status from observations by staff  •  Follow up  •  Patient education  •  Intervention secondary to interdisciplinary rounds  •   concerns      Treatment:    The following diet has been recommended:  Increase Jevity 1.5cal by 10ml/hr every 8 hours to goal rate 60ml/hr x 20 hours (1200ml/daily) to provide 1800kcal and 77 grams protein.      PROVIDER Section:     By signing this assessment you are acknowledging and agree with the diagnosis/diagnoses assigned by the Registered Dietitian    Comments:            X

## 2017-01-29 NOTE — PROGRESS NOTE ADULT - ASSESSMENT
1) Toxic metabolic encephalopathy in setting of parkinsons --> likely secondary to viral syndrome vs pneumonia  --> repeat chest xray shows pna and started on abx  --> dc fluids and tube feeds started  --> supportive care    2) Viral syndrome --> rhinovirus  --> supportive care    3) Parkinsons --> resume parkinsons meds in ng tube    4) dvt prop --> hep sub q    5) dysphagia --> failing swallow eval  --< start jevity and nutrition consult placed     6) hypernatremia --> resolved   --> bmp daily   --> dc fluids    full code  palliative consult placed  poor prognosis

## 2017-01-30 NOTE — PROGRESS NOTE ADULT - SUBJECTIVE AND OBJECTIVE BOX
JEANCLAUDE MARS    [id number]    71y      Male    INTERVAL HPI/OVERNIGHT EVENTS:    patient being seen for viral syndrome, toxic metabolic encephalopathy, possible pneumonia and parkinson's.     REVIEW OF SYSTEMS:    CONSTITUTIONAL: No fever, weight loss, or fatigue  RESPIRATORY: No cough, wheezing, hemoptysis; No shortness of breath  CARDIOVASCULAR: No chest pain, palpitations  GASTROINTESTINAL: No abdominal or epigastric pain. No nausea, vomiting  NEUROLOGICAL: No headaches, memory loss, loss of strength.  MISCELLANEOUS:      Vital Signs Last 24 Hrs  T(C): 36.6, Max: 36.6 (01-30 @ 00:46)  T(F): 97.9, Max: 97.9 (01-30 @ 00:46)  HR: 75 (74 - 86)  BP: 126/65 (126/65 - 134/74)  BP(mean): --  RR: 16 (16 - 18)  SpO2: --    PHYSICAL EXAM:    GENERAL: NAD, well-groomed  HEENT: PERRL, +EOMI  NECK: soft, Supple, No JVD,   CHEST/LUNG: Clear to percussion bilaterally; No wheezing  HEART: S1S2+, Regular rate and rhythm; No murmurs, rubs, or gallops  ABDOMEN: Soft, Nontender, Nondistended; Bowel sounds present  EXTREMITIES:  2+ Peripheral Pulses, No clubbing, cyanosis, or edema  SKIN: No rashes or lesions  NEURO: AAOX3, no focal deficits, no motor r sensory loss  PSYCH: normal mood      LABS:                        12.0   9.79  )-----------( 177      ( 30 Jan 2017 06:33 )             34.6     30 Jan 2017 06:33    140    |  103    |  19.0   ----------------------------<  119    3.1     |  25.0   |  0.78     Ca    9.1        30 Jan 2017 06:33  Mg     2.0       30 Jan 2017 06:33      MEDICATIONS  (STANDING):  carbidopa/levodopa  25/100 1Tablet(s) Oral four times a day  amantadine 100milliGRAM(s) Oral two times a day  ALBUTerol   0.042% 1.25milliGRAM(s) Nebulizer every 6 hours  heparin  Injectable 5000Unit(s) SubCutaneous every 12 hours  influenza   Vaccine 0.5milliLiter(s) IntraMuscular once  acetylcysteine 20% Inhalation 2milliLiter(s) Inhalation every 12 hours  levoFLOXacin IVPB  IV Intermittent   levoFLOXacin IVPB 500milliGRAM(s) IV Intermittent every 24 hours  potassium chloride   Solution 40milliEquivalent(s) Enteral Tube every 4 hours    MEDICATIONS  (PRN):  acetaminophen  Suppository 650milliGRAM(s) Rectal every 6 hours PRN For Temp greater than 38 C (100.4 F)      RADIOLOGY & ADDITIONAL TESTS: JEANCLAUDE MARS    [id number]    71y      Male    INTERVAL HPI/OVERNIGHT EVENTS:    patient being seen for viral syndrome, toxic metabolic encephalopathy, pneumonia and parkinsons. Patient seen at bedside and there is no change in mental status.     last 24 hours patient is afebrile    REVIEW OF SYSTEMS:    unable to obtain secondary to mental status       Vital Signs Last 24 Hrs  T(C): 36.6, Max: 36.6 (01-30 @ 00:46)  T(F): 97.9, Max: 97.9 (01-30 @ 00:46)  HR: 75 (74 - 86)  BP: 126/65 (126/65 - 134/74)  BP(mean): --  RR: 16 (16 - 18)  SpO2: --    PHYSICAL EXAM:    GENERAL: NAD, cachetic, contracted  HEENT: temporal wasting, dry MM   NECK: soft,   CHEST/LUNG: improved aeration   HEART: S1S2+, Regular rate and rhythm; No murmurs  ABDOMEN: Soft, Nontender, Nondistended;  EXTREMITIES:  2+ Peripheral Pulses, No edema  SKIN: No rashes or lesions  NEURO: AAOX0,           LABS:                        12.0   9.79  )-----------( 177      ( 30 Jan 2017 06:33 )             34.6     30 Jan 2017 06:33    140    |  103    |  19.0   ----------------------------<  119    3.1     |  25.0   |  0.78     Ca    9.1        30 Jan 2017 06:33  Mg     2.0       30 Jan 2017 06:33      MEDICATIONS  (STANDING):  carbidopa/levodopa  25/100 1Tablet(s) Oral four times a day  amantadine 100milliGRAM(s) Oral two times a day  ALBUTerol   0.042% 1.25milliGRAM(s) Nebulizer every 6 hours  heparin  Injectable 5000Unit(s) SubCutaneous every 12 hours  influenza   Vaccine 0.5milliLiter(s) IntraMuscular once  acetylcysteine 20% Inhalation 2milliLiter(s) Inhalation every 12 hours  levoFLOXacin IVPB  IV Intermittent   levoFLOXacin IVPB 500milliGRAM(s) IV Intermittent every 24 hours  potassium chloride   Solution 40milliEquivalent(s) Enteral Tube every 4 hours    MEDICATIONS  (PRN):  acetaminophen  Suppository 650milliGRAM(s) Rectal every 6 hours PRN For Temp greater than 38 C (100.4 F)      RADIOLOGY & ADDITIONAL TESTS:

## 2017-01-30 NOTE — PROGRESS NOTE ADULT - ASSESSMENT
1) Toxic metabolic encephalopathy in setting of parkinsons --> likely secondary to viral syndrome vs pneumonia  --> tube feeds started  --> supportive care    2) Viral syndrome --> rhinovirus  --> supportive care    3) Parkinsons --> resume parkinsons meds in ng tube    4) dvt prop --> hep sub q    5) dysphagia --> failing swallow eval  --< nutriton consult appreciated will adjust tube feeds    6) hypernatremia --> resolved   --> bmp daily     full code  palliative consult placed  poor prognosis

## 2017-01-31 NOTE — GOALS OF CARE CONVERSATION - PERSONAL ADVANCE DIRECTIVE - CONVERSATION DETAILS
discussed goals of care to return home with aide when stable.  Discussed treatments for constipation and discussed advanced care planning

## 2017-01-31 NOTE — CONSULT NOTE ADULT - ASSESSMENT
71 year old  debilitated male  lethargic  non verbal currently related to flu  daughter at bedside performing care

## 2017-01-31 NOTE — PROCEDURE NOTE - NSPOSTCAREGUIDE_GEN_A_CORE
Verbal/written post procedure instructions were given to patient/caregiver/Instructed patient/caregiver regarding signs and symptoms of infection/Instructed patient/caregiver to follow-up with primary care physician/Keep the cast/splint/dressing clean and dry

## 2017-01-31 NOTE — PROVIDER CONTACT NOTE (OTHER) - SITUATION
pt with order for NGT, PA came to replace at approx 2200. awaiting confirmation for NGT placement. to be read in the AM, as PA is unable to visualize ending of tube.

## 2017-01-31 NOTE — PROGRESS NOTE ADULT - SUBJECTIVE AND OBJECTIVE BOX
JEANCLAUDE MARS    [id number]    71y      Male    INTERVAL HPI/OVERNIGHT EVENTS:    patient being seen for viral syndrome, toxic metabolic encephalopathy, pneumonia and parkinsons. As per nurse ng tube was replaced and a chest xray done.     patient seen at bedside and is slightly more awake.       REVIEW OF SYSTEMS:    unable to obtain secondary to mental status     Vital Signs Last 24 Hrs  T(C): 36.8, Max: 36.9 (01-31 @ 04:58)  T(F): 98.3, Max: 98.4 (01-31 @ 04:58)  HR: 87 (87 - 98)  BP: 108/61 (108/61 - 117/88)  BP(mean): --  RR: 18 (18 - 18)  SpO2: 95% (95% - 95%)    PHYSICAL EXAM:    GENERAL: NAD, cachetic, contracted  HEENT: temporal wasting, dry MM   NECK: soft,   CHEST/LUNG: improved aeration   HEART: S1S2+, Regular rate and rhythm; No murmurs  ABDOMEN: Soft, Nontender, Nondistended;  EXTREMITIES:  2+ Peripheral Pulses, No edema  SKIN: No rashes or lesions  NEURO: AAOX0,       LABS:                        12.9   8.38  )-----------( 193      ( 31 Jan 2017 06:30 )             37.5     31 Jan 2017 06:30    143    |  105    |  20.0   ----------------------------<  76     4.3     |  26.0   |  0.89     Ca    9.4        31 Jan 2017 06:30  Mg     2.1       31 Jan 2017 06:30    MEDICATIONS  (STANDING):  carbidopa/levodopa  25/100 1Tablet(s) Oral four times a day  amantadine 100milliGRAM(s) Oral two times a day  ALBUTerol   0.042% 1.25milliGRAM(s) Nebulizer every 6 hours  heparin  Injectable 5000Unit(s) SubCutaneous every 12 hours  influenza   Vaccine 0.5milliLiter(s) IntraMuscular once  acetylcysteine 20% Inhalation 2milliLiter(s) Inhalation every 12 hours  senna 2Tablet(s) Oral two times a day  levoFLOXacin IVPB  IV Intermittent     MEDICATIONS  (PRN):  acetaminophen  Suppository 650milliGRAM(s) Rectal every 6 hours PRN For Temp greater than 38 C (100.4 F)  lactulose Syrup 10Gram(s) Oral every 8 hours PRN constipation      RADIOLOGY & ADDITIONAL TESTS:

## 2017-01-31 NOTE — CONSULT NOTE ADULT - ATTENDING COMMENTS
discussed with daughter at bedside  patient unable to communicate needs  daughter is care taker she states brother is not involved   discussed her fathers wishes related to  advanced care planning she states hes a fighter and always wants to fight that's the way he is she would like him to remain a full code

## 2017-01-31 NOTE — PROGRESS NOTE ADULT - ASSESSMENT
1) Toxic metabolic encephalopathy in setting of parkinsons --> likely secondary to viral syndrome vs pneumonia  --> tube feeds held  --> replace ng tube     2) Viral syndrome/pneumonia --> rhinovirus  --> supportive care  --> c.w levofloxacin     3) Parkinsons --> resume parkinsons meds in ng tube    4) dvt prop --> hep sub q    5) dysphagia --> failing swallow eval      6) hypernatremia --> resolved   --> bmp daily     full code  palliative consult appreciated  poor prognosis

## 2017-01-31 NOTE — CHART NOTE - NSCHARTNOTEFT_GEN_A_CORE
Pa Note          Called to read post NGT placement and determined to be not in place. Tube removed.  A new tube dobhof inserted to right nare with good auscultation for placement and awaiting Chest X-Ray for official tube confirmation.  Patient tolerated well.

## 2017-01-31 NOTE — CONSULT NOTE ADULT - SUBJECTIVE AND OBJECTIVE BOX
HPI:  71 years old male with PMH of Parkinsonism, total care dependent brought in to the ER for the evaluation of intermittent fever for the past 2 days associated with congestion and  thick secretions. The daughter states patient has difficulty and swallowing and his PO intake is minimal for the past few days. No chest pain, SOB, abdominal pain, diarrhea or constipation. (25 Jan 2017 18:48)      PERTINENT PMH REVIEWED:  [ ] YES [ ] NO           PMH:    SOCIAL HISTORY:                                     Admitted from: [ ] home [ ] SNF [ ] [facility]     Surrogate/HCP/Guardian: Phone#:    FAMILY HISTORY:  No pertinent family history in first degree relatives      Baseline ADLs (prior to admission):  Independent [ ] moderately [ ] fully   Dependent   [ ] moderately [ ]fully    MEDICATIONS  (STANDING):  carbidopa/levodopa  25/100 1Tablet(s) Oral four times a day  amantadine 100milliGRAM(s) Oral two times a day  ALBUTerol   0.042% 1.25milliGRAM(s) Nebulizer every 6 hours  heparin  Injectable 5000Unit(s) SubCutaneous every 12 hours  influenza   Vaccine 0.5milliLiter(s) IntraMuscular once  acetylcysteine 20% Inhalation 2milliLiter(s) Inhalation every 12 hours  levoFLOXacin IVPB  IV Intermittent   levoFLOXacin IVPB 500milliGRAM(s) IV Intermittent every 24 hours  senna 2Tablet(s) Oral two times a day    MEDICATIONS  (PRN):  acetaminophen  Suppository 650milliGRAM(s) Rectal every 6 hours PRN For Temp greater than 38 C (100.4 F)  lactulose Syrup 10Gram(s) Oral every 8 hours PRN constipation      Allergies    Allergy Status Unknown    Intolerances        Review of Systems: Reviewed                     Positive:  [ ] All others negative                 [ ] Unable to obtain due to poor mentation     PHYSICAL EXAM:    Vital Signs Last 24 Hrs  T(C): 36.9, Max: 36.9 (01-31 @ 04:58)  T(F): 98.4, Max: 98.4 (01-31 @ 04:58)  HR: 98 (75 - 98)  BP: 117/88 (117/88 - 126/65)  BP(mean): --  RR: 18 (16 - 18)  SpO2: 95% (95% - 95%)    General: [ ] alert  [ ] oriented x ____ [ ] lethargic [ ] agitated                  [ ] cachexia  [ ] nonverbal  [ ] coma    HEENT: [ ] normal  [ ] dry mouth  [ ] ET tube/trach    Lungs: [ ] comfortable [ ] tachypnea/labored breathing  [ ] excessive secretions    CV: [ ] normal  [ ] tachycardia    GI: [ ] normal  [ ] distended  [ ] tender  [ ] no BS               [ ] PEG/NG/OG tube    : [ ] normal  [ ] incontinent  [ ] oliguria/anuria  [ ] milton    MSK: [ ] normal  [ ] weakness  [ ] edema             [ ] ambulatory  [ ] bedbound/wheelchair bound    Skin: [ ] normal  [ ] pressure ulcers- Stage_____  [ ] no rash    LABS:                        12.0   9.79  )-----------( 177      ( 30 Jan 2017 06:33 )             34.6     30 Jan 2017 06:33    140    |  103    |  19.0   ----------------------------<  119    3.1     |  25.0   |  0.78     Ca    9.1        30 Jan 2017 06:33  Mg     2.0       30 Jan 2017 06:33          I&O's Summary    I & Os for current day (as of 31 Jan 2017 06:23)  =============================================  IN: 710 ml / OUT: 2 ml / NET: 708 ml      RADIOLOGY & ADDITIONAL STUDIES:    ADVANCE DIRECTIVES:  [ ] YES [ ] NO   DNR [ ] YES [ ] NO  Completed on:                     MOLST  [ ] YES [ ] NO   Completed on:  Living Will  [ ] YES [ ] NO   Completed on:    COUNSELING:  Advanced Care Planning Discussion - Time Spent ______Minutes.   More than 50% time spent in counseling and coordinating care. ______ Minutes.     Thank you for the opportunity to assist with the care of this patient.   [facility] [phone]. HPI:  71 years old male with PMH of Parkinsonism, total care dependent brought in to the ER for the evaluation of intermittent fever for the past 2 days associated with congestion and  thick secretions. The daughter states patient has difficulty and swallowing and his PO intake is minimal for the past few days. No chest pain, SOB, abdominal pain, diarrhea or constipation. (25 Jan 2017 18:48)      PERTINENT PMH REVIEWED:  [x ] YES [ ] NO           PMH:    SOCIAL HISTORY:                                     Admitted from: [x ] home [ ] SNF [ ] [facility]     Surrogate/HCP/Guardian: Phone#:  daughter health care surrogate zhou  [phone]    FAMILY HISTORY:  No pertinent family history in first degree relatives      Baseline ADLs (prior to admission):  Independent [ ] moderately [ ] fully   Dependent   [ ] moderately [x ]fully    MEDICATIONS  (STANDING):  carbidopa/levodopa  25/100 1Tablet(s) Oral four times a day  amantadine 100milliGRAM(s) Oral two times a day  ALBUTerol   0.042% 1.25milliGRAM(s) Nebulizer every 6 hours  heparin  Injectable 5000Unit(s) SubCutaneous every 12 hours  influenza   Vaccine 0.5milliLiter(s) IntraMuscular once  acetylcysteine 20% Inhalation 2milliLiter(s) Inhalation every 12 hours  levoFLOXacin IVPB  IV Intermittent   levoFLOXacin IVPB 500milliGRAM(s) IV Intermittent every 24 hours  senna 2Tablet(s) Oral two times a day    MEDICATIONS  (PRN):  acetaminophen  Suppository 650milliGRAM(s) Rectal every 6 hours PRN For Temp greater than 38 C (100.4 F)  lactulose Syrup 10Gram(s) Oral every 8 hours PRN constipation      Allergies    Allergy Status Unknown    Intolerances        Review of Systems: Reviewed                     Positive:  dysphagia  and lethargy  debility  [x ] All others negative                 [ ] Unable to obtain due to poor mentation     PHYSICAL EXAM:    Vital Signs Last 24 Hrs  T(C): 36.9, Max: 36.9 (01-31 @ 04:58)  T(F): 98.4, Max: 98.4 (01-31 @ 04:58)  HR: 98 (75 - 98)  BP: 117/88 (117/88 - 126/65)  BP(mean): --  RR: 18 (16 - 18)  SpO2: 95% (95% - 95%)    General: [ ] alert  [ ] oriented x ____ [ x] lethargic [ ] agitated                  [x ] cachexia  [x ] nonverbal   currently [    HEENT: [ ] normal  [x ] dry mouth  [ ] ET tube/trach    Lungs: x[ ] comfortable [ ] tachypnea/labored breathing  [ ] excessive secretions    CV: [x ] normal  [ ] tachycardia    GI: [ ] normal  [ ] distended  [ ] tender  [ ] no BS//constipated               [x ]NG tube    : [ ] normal  [x ] incontinent  [ ] oliguria/anuria  [ ] milton    MSK: [ ] normal  [ ]x weakness  [ ] edema             [ ] ambulatory  [x ] bedbound/wheelchair bound    Skin: [x ] normal  [ ] pressure ulcers- Stage_____  [ ] no rash    LABS:                        12.0   9.79  )-----------( 177      ( 30 Jan 2017 06:33 )             34.6     30 Jan 2017 06:33    140    |  103    |  19.0   ----------------------------<  119    3.1     |  25.0   |  0.78     Ca    9.1        30 Jan 2017 06:33  Mg     2.0       30 Jan 2017 06:33          I&O's Summary    I & Os for current day (as of 31 Jan 2017 06:23)  =============================================  IN: 710 ml / OUT: 2 ml / NET: 708 ml      RADIOLOGY & ADDITIONAL STUDIES:    ADVANCE DIRECTIVES:  [ ] YES [ ] NO   DNR [ ] YES [x ] NO  Completed on:                     MOLST  [ ] YES [x ] NO   Completed on:  Living Will  [ ] YES [x ] NO   Completed on:    COUNSELING:  Advanced Care Planning Discussion - Time Spent _25_____Minutes.   More than 50% time spent in counseling and coordinating care. ___50___ Minutes.     Thank you for the opportunity to assist with the care of this patient.   [facility] Consult [facility] [phone].

## 2017-02-01 NOTE — SWALLOW BEDSIDE ASSESSMENT ADULT - SLP GENERAL OBSERVATIONS
Pt received awake, +NGT, +open mouth posture, daughter and cousin present. Pt English speaking, however daughter utilizing Brazilian Creole in an attempt to improve participation and comprehension.

## 2017-02-01 NOTE — SWALLOW BEDSIDE ASSESSMENT ADULT - SLP PERTINENT HISTORY OF CURRENT PROBLEM
Pt known to this service, and is s/p multiple bedside swallow assessments rxing NPO status due to poor arousal and severe oral stage deficits. Per daughter, pt now with improved arousal and attempts to swallow. +h/o Parkinson's; per daughter pt has not had his medication until last Sunday (3 days ago) and "needs to catch up on his medication" which may be further - impacting swallow function.

## 2017-02-01 NOTE — SWALLOW BEDSIDE ASSESSMENT ADULT - ORAL PHASE
Delayed oral transit time/Decreased anterior-posterior movement of the bolus/Lingual stasis Decreased anterior-posterior movement of the bolus/Delayed oral transit time/Lingual stasis

## 2017-02-01 NOTE — PROGRESS NOTE ADULT - SUBJECTIVE AND OBJECTIVE BOX
JEANCLAUDE MARS    [id number]    71y      Male    INTERVAL HPI/OVERNIGHT EVENTS:    patient being seen for viral syndrome, toxic metabolic encephalopathy, pneumonia and advanced parkinsons. Patient had a doboff placed yesterday.     patient seen at bedside and is more awake but not alert. Last 24 hours patient is afebrile.         REVIEW OF SYSTEMS:    unable to obtain secondary to mental status     Vital Signs Last 24 Hrs  T(C): 36.6, Max: 36.6 (02-01 @ 00:24)  T(F): 97.8, Max: 97.8 (02-01 @ 00:24)  HR: 75 (75 - 75)  BP: 110/62 (110/62 - 110/62)  BP(mean): --  RR: 18 (18 - 18)  SpO2: 96% (94% - 96%)    PHYSICAL EXAM:    GENERAL: NAD, cachetic, contracted  HEENT: temporal wasting, dry MM   NECK: soft,   CHEST/LUNG: improved aeration   HEART: S1S2+, Regular rate and rhythm; No murmurs  ABDOMEN: Soft, Nontender, Nondistended;  EXTREMITIES:  2+ Peripheral Pulses, No edema  SKIN: No rashes or lesions  NEURO: AAOX0,       LABS:                        12.0   7.70  )-----------( 268      ( 01 Feb 2017 06:27 )             35.4     01 Feb 2017 06:27    147    |  107    |  21.0   ----------------------------<  129    4.1     |  28.0   |  0.91     Ca    9.3        01 Feb 2017 06:27  Mg     2.2       01 Feb 2017 06:27      MEDICATIONS  (STANDING):  carbidopa/levodopa  25/100 1Tablet(s) Oral four times a day  amantadine 100milliGRAM(s) Oral two times a day  ALBUTerol   0.042% 1.25milliGRAM(s) Nebulizer every 6 hours  heparin  Injectable 5000Unit(s) SubCutaneous every 12 hours  influenza   Vaccine 0.5milliLiter(s) IntraMuscular once  senna 2Tablet(s) Oral two times a day  levoFLOXacin IVPB  IV Intermittent   levoFLOXacin IVPB 500milliGRAM(s) IV Intermittent every 24 hours  saliva substitute (BIOTENE MOISTURIZING MOUTH SPRAY) 1Spray(s) Topical three times a day    MEDICATIONS  (PRN):  acetaminophen  Suppository 650milliGRAM(s) Rectal every 6 hours PRN For Temp greater than 38 C (100.4 F)  lactulose Syrup 10Gram(s) Oral every 8 hours PRN constipation      RADIOLOGY & ADDITIONAL TESTS:

## 2017-02-01 NOTE — PROGRESS NOTE ADULT - ASSESSMENT
1) Toxic metabolic encephalopathy in setting of advanced parkinsons --> likely secondary to viral syndrome vs pneumonia  --> supportive care  --> slighlty more awake recalled speech     2) Viral syndrome --> rhinovirus  --> supportive care    3) Parkinsons --> resume parkinsons meds in ng tube    4) dvt prop --> hep sub q    5) dysphagia --> continue tube feeds  --< nutrition consult appreciated will adjust tube feeds    6) Dry mouth --> biotene  --> swabs    7) hypernatremia --> resolved   --> bmp daily     full code  palliative consult appreciated, family wants all things done  poor prognosis 1) Toxic metabolic encephalopathy in setting of advanced parkinsons --> likely secondary to viral syndrome vs pneumonia  --> supportive care  --> slighlty more awake recalled speech     2) Viral syndrome --> rhinovirus  --> supportive care    3) Parkinsons --> resume parkinsons meds in ng tube    4) dvt prop --> hep sub q    5) dysphagia --> continue tube feeds  --< nutrition consult appreciated will adjust tube feeds    6) Dry mouth --> biotene  --> swabs    7) hypernatremia --> sodium slightly elevated  --> bmp daily     full code  palliative consult appreciated, family wants all things done  poor prognosis

## 2017-02-02 NOTE — PROGRESS NOTE ADULT - ASSESSMENT
1) Toxic metabolic encephalopathy in setting of advanced parkinsons --> likely secondary to viral syndrome vs pneumonia  --> supportive care    2) Viral syndrome --> rhinovirus  --> supportive care    3) Parkinsons --> resume parkinsons meds in ng tube    4) dvt prop --> hep sub q    5) dysphagia --> continue tube feeds    6) Dry mouth --> biotene  --> swabs    7) hypernatremia --> sodium slightly elevated  --> d5w started    full code  palliative consult appreciated, family wants all things done and wants to wait on peg tube   informed daughter of dangers of continued ng tube and she is aware  poor prognosis

## 2017-02-02 NOTE — PROGRESS NOTE ADULT - SUBJECTIVE AND OBJECTIVE BOX
JEANCLAUDE MARS    [id number]    71y      Male    INTERVAL HPI/OVERNIGHT EVENTS:    patient being seen for viral syndrome, toxic metabolic encephalopathy, pneumonia and advanced parkinsons. Patient seen at bedside and no appreciable change.       REVIEW OF SYSTEMS:    unable to obtain       Vital Signs Last 24 Hrs  T(C): 36.4, Max: 36.6 (02-01 @ 16:15)  T(F): 97.6, Max: 97.8 (02-01 @ 16:15)  HR: 77 (77 - 86)  BP: 111/65 (111/65 - 115/68)  BP(mean): --  RR: 18 (18 - 18)  SpO2: 98% (95% - 98%)    PHYSICAL EXAM:    GENERAL: NAD, cachetic, contracted  HEENT: temporal wasting, dry MM   NECK: soft,   CHEST/LUNG: improved aeration   HEART: S1S2+, Regular rate and rhythm; No murmurs  ABDOMEN: Soft, Nontender, Nondistended;  EXTREMITIES:  2+ Peripheral Pulses, No edema  SKIN: No rashes or lesions  NEURO: AAOX0,       LABS:                        11.8   11.07 )-----------( 267      ( 02 Feb 2017 09:10 )             34.6     02 Feb 2017 09:10    147    |  107    |  20.0   ----------------------------<  135    4.2     |  27.0   |  0.80     Ca    9.2        02 Feb 2017 09:10  Mg     2.2       01 Feb 2017 06:27        MEDICATIONS  (STANDING):  carbidopa/levodopa  25/100 1Tablet(s) Oral four times a day  amantadine 100milliGRAM(s) Oral two times a day  ALBUTerol   0.042% 1.25milliGRAM(s) Nebulizer every 6 hours  heparin  Injectable 5000Unit(s) SubCutaneous every 12 hours  influenza   Vaccine 0.5milliLiter(s) IntraMuscular once  senna 2Tablet(s) Oral two times a day  levoFLOXacin IVPB  IV Intermittent   levoFLOXacin IVPB 500milliGRAM(s) IV Intermittent every 24 hours  saliva substitute (BIOTENE MOISTURIZING MOUTH SPRAY) 1Spray(s) Topical three times a day    MEDICATIONS  (PRN):  acetaminophen  Suppository 650milliGRAM(s) Rectal every 6 hours PRN For Temp greater than 38 C (100.4 F)  lactulose Syrup 10Gram(s) Oral every 8 hours PRN constipation      RADIOLOGY & ADDITIONAL TESTS:

## 2017-02-03 NOTE — PROCEDURE NOTE - NSSITEPREP_SKIN_A_CORE
Adherence to aseptic technique: hand hygiene prior to donning barriers (gown, gloves), don cap and mask, sterile drape over patient

## 2017-02-03 NOTE — PROCEDURE NOTE - NSPOSTPRCRAD_GEN_A_CORE
xray pending
feeding tube in correct intestinal position
post-procedure radiography performed
post-procedure radiography performed

## 2017-02-03 NOTE — PROCEDURE NOTE - ADDITIONAL PROCEDURE DETAILS
Unable to remove previous guide wire.  NGT replaced in right nostril without difficulty.  Dobhoff tip below diaphragm, likely in duodenum.  Guide wire removed without difficulty.  RN to resume feeding as per MD order

## 2017-02-03 NOTE — PROGRESS NOTE ADULT - SUBJECTIVE AND OBJECTIVE BOX
JEANCLAUDE MARS    [id number]    71y      Male    INTERVAL HPI/OVERNIGHT EVENTS:      patient being seen for viral syndrome, toxic metabolic encephalopathy, pneumonia and advanced parkinsons. Patient much more awake today. Patient is making purposeful movements.     last 24 hours patient is afebrile.     REVIEW OF SYSTEMS:    unable to obtain secondary to mental status    Vital Signs Last 24 Hrs  T(C): 36.2, Max: 36.8 (02-02 @ 18:37)  T(F): 97.1, Max: 98.2 (02-02 @ 18:37)  HR: 71 (71 - 88)  BP: 110/62 (110/62 - 119/67)  BP(mean): --  RR: 18 (18 - 20)  SpO2: --    PHYSICAL EXAM:    GENERAL: NAD, cachetic, contracted  HEENT: temporal wasting, dry MM   NECK: soft,   CHEST/LUNG: improved aeration   HEART: S1S2+, Regular rate and rhythm; No murmurs  ABDOMEN: Soft, Nontender, Nondistended;  EXTREMITIES:  2+ Peripheral Pulses, No edema  SKIN: No rashes or lesions  NEURO: AAOX0,       LABS:                        11.1   11.28 )-----------( 312      ( 03 Feb 2017 06:33 )             32.6     03 Feb 2017 06:33    137    |  102    |  17.0   ----------------------------<  114    3.7     |  25.0   |  0.71     Ca    9.1        03 Feb 2017 06:33  Mg     2.0       03 Feb 2017 06:33        MEDICATIONS  (STANDING):  carbidopa/levodopa  25/100 1Tablet(s) Oral four times a day  amantadine 100milliGRAM(s) Oral two times a day  ALBUTerol   0.042% 1.25milliGRAM(s) Nebulizer every 6 hours  heparin  Injectable 5000Unit(s) SubCutaneous every 12 hours  influenza   Vaccine 0.5milliLiter(s) IntraMuscular once  senna 2Tablet(s) Oral two times a day  levoFLOXacin IVPB  IV Intermittent   levoFLOXacin IVPB 500milliGRAM(s) IV Intermittent every 24 hours  saliva substitute (BIOTENE MOISTURIZING MOUTH SPRAY) 1Spray(s) Topical three times a day  dextrose 5% 1000milliLiter(s) IV Continuous <Continuous>    MEDICATIONS  (PRN):  acetaminophen  Suppository 650milliGRAM(s) Rectal every 6 hours PRN For Temp greater than 38 C (100.4 F)  lactulose Syrup 10Gram(s) Oral every 8 hours PRN constipation      RADIOLOGY & ADDITIONAL TESTS:

## 2017-02-03 NOTE — PROCEDURE NOTE - NSTOLERANCE_GEN_A_CORE
Patient tolerated procedure well.

## 2017-02-03 NOTE — PROGRESS NOTE ADULT - ASSESSMENT
1) Toxic metabolic encephalopathy in setting of advanced parkinsons --> likely secondary to viral syndrome vs pneumonia  --> supportive care  --> improving    2) Viral syndrome --> rhinovirus  --> supportive care    3) Parkinsons --> parkinsons meds in ng tube    4) dysphagia --> continue tube feeds    5) Dry mouth --> biotene  --> swabs    6) hypernatremia --> resolved  --> bmp in am    full code  poor prognosis

## 2017-02-04 NOTE — PROGRESS NOTE ADULT - SUBJECTIVE AND OBJECTIVE BOX
JEANCLAUDE MARS    [id number]    71y      Male    INTERVAL HPI/OVERNIGHT EVENTS:    off service note":  Patient is a 71 years old male with PMH of Parkinsonism, total care dependent brought in to the ER for the evaluation of intermittent fever for 2 days and secretions. The daughter states patient has had difficulty swallowing and has not taken his sinemet and amantadine over the last several days prior to admission. Patient admitted and cultured and rvp came back positive for rhinovirus and abx were not started initially. SPeech was called for swallow evaluation and failed multiple times and finally on 1/27 a ng tube was attempted 2 times without success and finally the PA later that night was able to place a ng tube and chest xray at that time showed possible infiltrates and started on levaquin. (1/28). Patient then started to get his parkinsons meds but did not show much improvement. Palliative was called and I spoke to his daughter who is a nurse at another facility and refused for GI to be called for a peg evaluation. PLus patients daughter wanted all things done and does not want DNR DNI.     Patient has become slighlty more awake and is moving his extremities and did pull out his ng tube a couple of times and needed to be replaced. PLus a 1:1 was placed overnight for closer observation. Patient is on jevity 1.5 at 60 cc hour currently and speech was recalled but failed again. Patient has not spiked a fever since admission and at times became hypernatremic and needed to be placed on d5w at times.       REVIEW OF SYSTEMS:    unable to obtain secondary to mental status      Vital Signs Last 24 Hrs  T(C): 36.1, Max: 36.1 (02-03 @ 23:41)  T(F): 97, Max: 97 (02-03 @ 23:41)  HR: 80 (66 - 80)  BP: 109/60 (109/60 - 129/80)  BP(mean): --  RR: 18 (18 - 20)  SpO2: 88% (88% - 95%)    PHYSICAL EXAM:    GENERAL: NAD, cachetic, contracted  HEENT: temporal wasting, dry MM   NECK: soft,   CHEST/LUNG: improved aeration   HEART: S1S2+, Regular rate and rhythm; No murmurs  ABDOMEN: Soft, Nontender, Nondistended;  EXTREMITIES:  2+ Peripheral Pulses, No edema  SKIN: No rashes or lesions  NEURO: AAOX0,     LABS:  \  no new labs     MEDICATIONS  (STANDING):  carbidopa/levodopa  25/100 1Tablet(s) Oral four times a day  amantadine 100milliGRAM(s) Oral two times a day  ALBUTerol   0.042% 1.25milliGRAM(s) Nebulizer every 6 hours  heparin  Injectable 5000Unit(s) SubCutaneous every 12 hours  influenza   Vaccine 0.5milliLiter(s) IntraMuscular once  senna 2Tablet(s) Oral two times a day  saliva substitute (BIOTENE MOISTURIZING MOUTH SPRAY) 1Spray(s) Topical three times a day  dextrose 5% 1000milliLiter(s) IV Continuous <Continuous>  levoFLOXacin  Tablet 500milliGRAM(s) Oral every 24 hours    MEDICATIONS  (PRN):  acetaminophen  Suppository 650milliGRAM(s) Rectal every 6 hours PRN For Temp greater than 38 C (100.4 F)  lactulose Syrup 10Gram(s) Oral every 8 hours PRN constipation  acetaminophen    Suspension. 650milliGRAM(s) Enteral Tube every 6 hours PRN Moderate Pain (4 - 6)  oxyCODONE IR 5milliGRAM(s) Oral every 6 hours PRN Severe Pain (7 - 10)      RADIOLOGY & ADDITIONAL TESTS:

## 2017-02-04 NOTE — PROGRESS NOTE ADULT - ASSESSMENT
1) Toxic metabolic encephalopathy in setting of advanced parkinsons --> likely secondary to viral syndrome vs pneumonia  --> supportive care  --> minimal improvement     2) Viral syndrome --> rhinovirus  --> supportive care    3) Parkinsons --> parkinsons meds in ng tube  --> sinemet and amantadine     4) dysphagia --> continue tube feeds    5) Dry mouth --> biotene  --> swabs    6) hypernatremia --> resolved  --> bmp in am    7) severe protein calorie malnutriton --. nutrition consult appreciated    full code  poor prognosis   refusing GI consult

## 2017-02-05 NOTE — PROGRESS NOTE ADULT - ASSESSMENT
1) advanced parkinsons -->Mental state complicated with viral syndrome/pneumonia.   --> supportive care  --> minimal to no improvement     2) Viral syndrome --> rhinovirus  --> supportive care    3) Parkinsons --> parkinsons meds in ng tube  --> sinemet and amantadine     4) dysphagia --> continue tube feeds for now  Will discuss with speech therapy in am     5) Dry mouth --> biotene  --> swabs    6) hypernatremia --> resolved  --> bmp in am    7) severe protein calorie malnutriton --. nutrition consult appreciated    full code  poor prognosis   refusing GI consult

## 2017-02-05 NOTE — PROGRESS NOTE ADULT - SUBJECTIVE AND OBJECTIVE BOX
HPI:   71 years old male with PMH of Parkinsonism, total care dependent brought in to the ER for the evaluation of intermittent fever for 2 days and secretions. The daughter states patient has had difficulty swallowing and has not taken his sinemet and amantadine over the last several days prior to admission. Patient admitted and cultured and rvp came back positive for rhinovirus and abx were not started initially. SPeech was called for swallow evaluation and failed multiple times and finally on 1/27 a ng tube was attempted 2 times without success and finally the PA later that night was able to place a ng tube and chest xray at that time showed possible infiltrates and started on levaquin. (1/28). Patient then started to get his parkinsons meds but did not show much improvement. Palliative was called and I spoke to his daughter who is a nurse at another facility and refused for GI to be called for a peg evaluation. patients daughter wanted all things done and does not want DNR DNI.     Patient is slightly more awake and is moving his extremities and did pull out his ng tube more than a couple of times and needed to be replaced. 1:1 was placed overnight for closer observation. Patient is on jevity 1.5 at 60 cc hour currently and speech was recalled but failed again. Patient has not spiked a fever since admission and at times became hypernatremic and needed to be placed on d5w at times.     CC: winces, cannot localize pain.? abdominal pain.     REVIEW OF SYSTEMS:    unable to obtain secondary to mental status      Vital Signs Last 24 Hrs  T(C): 36.3, Max: 36.3 (02-04 @ 23:44)  T(F): 97.4, Max: 97.4 (02-04 @ 23:44)  HR: 70 (70 - 70)  BP: 118/75 (118/75 - 118/75)  BP(mean): --  RR: 18 (18 - 18)  SpO2: 99% (99% - 99%)    PHYSICAL EXAM:    GENERAL: NAD, cachetic, contracted  HEENT: temporal wasting, dry MM   NECK: soft,   CHEST/LUNG: improved aeration   HEART: S1S2+, Regular rate and rhythm; No murmurs  ABDOMEN: Soft, ?tender? winces to anything even without touching. Nondistended;  EXTREMITIES:  2+ Peripheral Pulses, No edema  SKIN: No rashes or lesions  NEURO: AAOX1 - Opens eyes to his name     LABS:                          11.7   9.46  )-----------( 322      ( 05 Feb 2017 05:18 )             34.8   05 Feb 2017 05:18    135    |  98     |  12.0   ----------------------------<  101    3.9     |  28.0   |  0.69     Ca    8.8        05 Feb 2017 05:18          MEDICATIONS  (STANDING):  carbidopa/levodopa  25/100 1Tablet(s) Oral four times a day  ALBUTerol   0.042% 1.25milliGRAM(s) Nebulizer every 6 hours  heparin  Injectable 5000Unit(s) SubCutaneous every 12 hours  influenza   Vaccine 0.5milliLiter(s) IntraMuscular once  senna 2Tablet(s) Oral two times a day  saliva substitute (BIOTENE MOISTURIZING MOUTH SPRAY) 1Spray(s) Topical three times a day  levoFLOXacin  Tablet 500milliGRAM(s) Oral every 24 hours  amantadine 100milliGRAM(s) Oral two times a day    MEDICATIONS  (PRN):  acetaminophen  Suppository 650milliGRAM(s) Rectal every 6 hours PRN For Temp greater than 38 C (100.4 F)  lactulose Syrup 10Gram(s) Oral every 8 hours PRN constipation  acetaminophen    Suspension. 650milliGRAM(s) Enteral Tube every 6 hours PRN Moderate Pain (4 - 6)  oxyCODONE IR 5milliGRAM(s) Oral every 6 hours PRN Severe Pain (7 - 10)      RADIOLOGY & ADDITIONAL TESTS:  CXR reviewed  KUB reviewed

## 2017-02-06 NOTE — SWALLOW BEDSIDE ASSESSMENT ADULT - ASR SWALLOW ASPIRATION MONITOR
change of breathing pattern/position upright (90Y)/gurgly voice/cough/fever/oral hygiene/pneumonia/throat clearing
pneumonia/upper respiratory infection/change of breathing pattern/position upright (90Y)/cough/fever/throat clearing/oral hygiene/gurgly voice
change of breathing pattern/position upright (90Y)/gurgly voice/oral hygiene/cough/fever/pneumonia/throat clearing/upper respiratory infection

## 2017-02-06 NOTE — SWALLOW BEDSIDE ASSESSMENT ADULT - SLP PERTINENT HISTORY OF CURRENT PROBLEM
Pt known to this service for prior beside swallow evaluations, most recent 1/1/17, in which recommendation was to maintain NPO status Pt known to this service for prior beside swallow evaluations, most recent 2/1/17, in which recommendation was to maintain NPO status

## 2017-02-06 NOTE — SWALLOW BEDSIDE ASSESSMENT ADULT - DIET PRIOR TO ADMI
Puree-Soft foods with Thin liquids per daughter
soft with thin fluids, as per daughter
Regular with thin liquids per daughter

## 2017-02-06 NOTE — PROGRESS NOTE ADULT - SUBJECTIVE AND OBJECTIVE BOX
HPI:   71 years old male with PMH of Parkinsonism, total care dependent brought in to the ER for the evaluation of intermittent fever for 2 days and secretions. The daughter states patient has had difficulty swallowing and has not taken his sinemet and amantadine over the last several days prior to admission. Patient admitted and cultured and rvp came back positive for rhinovirus and abx were not started initially. SPeech was called for swallow evaluation and failed multiple times and finally on 1/27 a ng tube was attempted 2 times without success and finally the PA later that night was able to place a ng tube and chest xray at that time showed possible infiltrates and started on levaquin. (1/28). Patient then started to get his parkinsons meds but did not show much improvement. Palliative was called and I spoke to his daughter who is a nurse at another facility and refused for GI to be called for a peg evaluation. patients daughter wanted all things done and does not want DNR DNI.     Patient is slightly more awake and is moving his extremities and did pull out his ng tube more than a couple of times and needed to be replaced. 1:1 was placed overnight for closer observation. Patient is on jevity 1.5 at 60 cc hour currently and speech was recalled but failed again. Patient has not spiked a fever since admission and at times became hypernatremic and needed to be placed on d5w at times.     CC: Cannot vocalize. Opens eyes to voice, Says yes to pain but cannot localize    REVIEW OF SYSTEMS:    unable to obtain secondary to mental status    Vital Signs Last 24 Hrs  T(C): 36.5, Max: 36.5 (02-06 @ 00:18)  T(F): 97.7, Max: 97.7 (02-06 @ 00:18)  HR: 74 (74 - 78)  BP: 111/63 (92/60 - 111/63)  BP(mean): --  RR: 18 (18 - 18)  SpO2: 98% (98% - 98%)      PHYSICAL EXAM:    GENERAL: NAD, cachetic, contracted  HEENT: temporal wasting, dry MM   NECK: soft,   CHEST/LUNG: improved aeration   HEART: S1S2+, Regular rate and rhythm; No murmurs  ABDOMEN: Soft,  Nondistended;  EXTREMITIES:  2+ Peripheral Pulses, No edema  SKIN: No rashes or lesions  NEURO: AAOX1 - Opens eyes to his name     LABS:                          11.7   9.46  )-----------( 322      ( 05 Feb 2017 05:18 )             34.8   05 Feb 2017 05:18    135    |  98     |  12.0   ----------------------------<  101    3.9     |  28.0   |  0.69     Ca    8.8        05 Feb 2017 05:18      MEDICATIONS  (STANDING):  carbidopa/levodopa  25/100 1Tablet(s) Oral four times a day  ALBUTerol   0.042% 1.25milliGRAM(s) Nebulizer every 6 hours  heparin  Injectable 5000Unit(s) SubCutaneous every 12 hours  influenza   Vaccine 0.5milliLiter(s) IntraMuscular once  senna 2Tablet(s) Oral two times a day  saliva substitute (BIOTENE MOISTURIZING MOUTH SPRAY) 1Spray(s) Topical three times a day  amantadine 100milliGRAM(s) Oral two times a day    MEDICATIONS  (PRN):  acetaminophen  Suppository 650milliGRAM(s) Rectal every 6 hours PRN For Temp greater than 38 C (100.4 F)  lactulose Syrup 10Gram(s) Oral every 8 hours PRN constipation  acetaminophen    Suspension. 650milliGRAM(s) Enteral Tube every 6 hours PRN Moderate Pain (4 - 6)  oxyCODONE IR 5milliGRAM(s) Oral every 6 hours PRN Severe Pain (7 - 10)      RADIOLOGY & ADDITIONAL TESTS:  CXR reviewed  KUB reviewed

## 2017-02-06 NOTE — SWALLOW BEDSIDE ASSESSMENT ADULT - SWALLOW EVAL: ORAL MUSCULATURE
+open mouth posture/unable to assess due to poor participation/comprehension
unable to assess due to poor participation/comprehension
unable to assess due to poor participation/comprehension

## 2017-02-06 NOTE — SWALLOW BEDSIDE ASSESSMENT ADULT - SLP GENERAL OBSERVATIONS
Pt received & seen bedside, with daughter, +NGT, +awake, +mostly non-verbal, +inconsistent command following Pt received & seen bedside, with daughter, +NGT, +awake, +mostly non-verbal, +poor command following Pt received & seen bedside, with daughter, +NGT, +awake, +open mouth posture at rest, +mostly non-verbal, +poor command following

## 2017-02-06 NOTE — SWALLOW BEDSIDE ASSESSMENT ADULT - SWALLOW EVAL: DIAGNOSIS
Severe oral dysphagia marked by reduced oral grading, poor lingual ROM and coordination, overall poor bolus formation and propulsion. Suspect pharyngeal dysphagia 2* significant delay in swallow trigger. Pt is at significant risk for aspiration and is not safe for PO at this time.
Severe oral dysphagia, unable to assess pharyngeal stage 2* no swallow elicited.
Moderate to severe oral dysphagia with suspected pharyngeal dysphagia

## 2017-02-06 NOTE — PROGRESS NOTE ADULT - ASSESSMENT
1) advanced parkinsons -->Mental state complicated with viral syndrome/pneumonia.   --> supportive care  --> minimal to no improvement     2) Viral syndrome --> rhinovirus  --> supportive care    3) Parkinsons --> parkinsons meds in ng tube  --> sinemet and amantadine     4) dysphagia --> continue tube feeds for now  Discussed with speech therapy - Will re evaluate after pt has been receiving his meds for 1 week    5) Dry mouth --> biotene  --> swabs    6) hypernatremia --> resolve    7) severe protein calorie malnutriton --. nutrition consult appreciated    full code  poor prognosis   refusing GI consult See Note from 2/5 - detailed documentation of conversation with daughter

## 2017-02-06 NOTE — SWALLOW BEDSIDE ASSESSMENT ADULT - PHARYNGEAL PHASE
inconsistent throat clearing post swallow/Delayed pharyngeal swallow/Decreased laryngeal elevation/Throat clear post oral intake

## 2017-02-06 NOTE — SWALLOW BEDSIDE ASSESSMENT ADULT - NS SPL SWALLOW CLINIC TRIAL FT
Bolus removed via yankaur suction by clinician 2* no swallow triggered
Pt noted to become lethargic, after ~ 7-8 spoonfuls of puree, with bolus noted to remain in posterior oral cavity, which was suctioned by this therapist

## 2017-02-06 NOTE — SWALLOW BEDSIDE ASSESSMENT ADULT - ORAL PHASE
Delayed oral transit time/Decreased anterior-posterior movement of the bolus/varied oral holding, up to >20 seconds

## 2017-02-06 NOTE — SWALLOW BEDSIDE ASSESSMENT ADULT - ADDITIONAL RECOMMENDATIONS
Continue short term nonoral means of nutrition and hydration with consideration for long-term alternative nutrition/hydration per pt and family wishes
Will re-assess x1, to re-assess swallow

## 2017-02-07 NOTE — PHYSICAL THERAPY INITIAL EVALUATION ADULT - ADDITIONAL COMMENTS
Pt is a poor historian. Per daughter at bedside:  Pt. lives in a house with her daughter. Pt has 24/7 care and in never alone. Pt required minAx1 for bed mobility and transfers with a RW. Pt required minAx1 for ambulating household distances with a RW and 2 person assist without AD. Pt owns a RW, shower chair, wheel chair, raised toilet seat.

## 2017-02-07 NOTE — PROGRESS NOTE ADULT - SUBJECTIVE AND OBJECTIVE BOX
INTERVAL HPI/OVERNIGHT EVENTS:    PRESENT SYMPTOMS: SOURCE:  Patient/Family/Team    PAIN SCALE:  0 = none  1 = mild   2 = moderate  3 = severe    Pain:     Dyspnea:  [ ] YES [x ] NO  Anxiety:  [ ] YES x[ ] NO  Fatigue: [x ] YES [ ] NO  Nausea: [ ] YES [ x] NO  Constipation [ ] YES [x ] NO  Loss of Appetite: [ ] YES [ ] NO  Other symptoms: NGT__________    MEDICATIONS  (STANDING):  carbidopa/levodopa  25/100 1Tablet(s) Oral four times a day  ALBUTerol   0.042% 1.25milliGRAM(s) Nebulizer every 6 hours  heparin  Injectable 5000Unit(s) SubCutaneous every 12 hours  influenza   Vaccine 0.5milliLiter(s) IntraMuscular once  senna 2Tablet(s) Oral two times a day  saliva substitute (BIOTENE MOISTURIZING MOUTH SPRAY) 1Spray(s) Topical three times a day  amantadine 100milliGRAM(s) Oral two times a day    MEDICATIONS  (PRN):  acetaminophen  Suppository 650milliGRAM(s) Rectal every 6 hours PRN For Temp greater than 38 C (100.4 F)  lactulose Syrup 10Gram(s) Oral every 8 hours PRN constipation  acetaminophen    Suspension. 650milliGRAM(s) Enteral Tube every 6 hours PRN Moderate Pain (4 - 6)  oxyCODONE IR 5milliGRAM(s) Oral every 6 hours PRN Severe Pain (7 - 10)      Allergies    Allergy Status Unknown    Intolerances        Review of Systems: Reviewed                     Positive: lethargic  unable to follow commands NPO  aspiration  [x ] All others negative  [ ] Unable to obtain due to poor mentation     PHYSICAL EXAM:    Vital Signs Last 24 Hrs  T(C): 36.4, Max: 36.4 (02-06 @ 23:55)  T(F): 97.5, Max: 97.6 (02-06 @ 23:55)  HR: 72 (69 - 79)  BP: 96/54 (96/54 - 134/65)  BP(mean): --  RR: 18 (18 - 20)  SpO2: 98% (98% - 98%)    General: [ ] alert  [ ] oriented x ____ [x ] lethargic [ ] agitated                  [x ] cachexia  [ x] nonverbal  [ ] coma    HEENT: [ ] normal  [x ] dry mouth  [ ] ET tube/trach    Lungs: [ x] comfortable [ ] tachypnea/labored breathing  [ ] excessive secretions    CV: [x ] normal  [ ] tachycardia    GI: [ ] normal  [ ] distended  [ ] tender  [ ] no BS               [x ] PEG/NG/OG tube    : [ ] normal  [x ] incontinent  [ ] oliguria/anuria  [ ] milton    MSK: [ ] normal  [x ] weakness  [ ] edema             [ ] ambulatory  [ x] bedbound/wheelchair bound    Skin: [ ] normal  [ ] pressure ulcers- Stage_____  [ ] no rash    LABS:              I&O's Summary      RADIOLOGY & ADDITIONAL STUDIES:    ADVANCE DIRECTIVES:  [ ] YES [x ] NO    DNR: [ ] YES [ x] NO      Date Completed:                    ASHLEY [ ] YES [ x] NO   Date Completed:     COUNSELING:  Advanced Care Planning Discussion - Time Spent _40_____Minutes.   More than 50% time spent in counseling and coordinating care. ______ Minutes.     Thank you for the opportunity to assist with the care of this patient.   [facility] [phone].

## 2017-02-07 NOTE — PHYSICAL THERAPY INITIAL EVALUATION ADULT - IMPAIRMENTS CONTRIBUTING TO GAIT DEVIATIONS, PT EVAL
narrow base of support/decreased strength/impaired balance/pain/decreased ROM/impaired postural control

## 2017-02-07 NOTE — PROGRESS NOTE ADULT - SUBJECTIVE AND OBJECTIVE BOX
HPI:   71 years old male with PMH of Parkinsonism, total care dependent brought in to the ER for the evaluation of intermittent fever for 2 days and secretions. The daughter states patient has had difficulty swallowing and has not taken his sinemet and amantadine over the last several days prior to admission. Patient admitted and cultured and rvp came back positive for rhinovirus and abx were not started initially. SPeech was called for swallow evaluation and failed multiple times and finally on 1/27 a ng tube was attempted 2 times without success and finally the PA later that night was able to place a ng tube and chest xray at that time showed possible infiltrates and started on levaquin. (1/28). Patient then started to get his parkinsons meds but did not show much improvement. Palliative was called and I spoke to his daughter who is a nurse at another facility and refused for GI to be called for a peg evaluation. patients daughter wanted all things done and does not want DNR DNI.     Patient is slightly more awake and is moving his extremities and did pull out his ng tube more than a couple of times and needed to be replaced. 1:1 was placed overnight for closer observation. Patient is on jevity 1.5 at 60 cc hour currently and speech was recalled but failed again. Patient has not spiked a fever since admission and at times became hypernatremic and needed to be placed on d5w at times.     CC: Cannot vocalize. Opens eyes to voice, not following commands    REVIEW OF SYSTEMS:    unable to obtain secondary to mental status    Vital Signs Last 24 Hrs  T(C): 36.1, Max: 36.4 (02-06 @ 23:55)  T(F): 97, Max: 97.6 (02-06 @ 23:55)  HR: 71 (69 - 79)  BP: 113/60 (113/60 - 134/65)  BP(mean): --  RR: 18 (18 - 20)  SpO2: 98% (98% - 99%)    PHYSICAL EXAM:    GENERAL: NAD, cachetic, contracted  HEENT: temporal wasting, dry MM   NECK: soft,   CHEST/LUNG: CTAB  HEART: S1S2+, Regular rate and rhythm; No murmurs  ABDOMEN: Soft,  Nondistended;  EXTREMITIES:  2+ Peripheral Pulses, No edema  SKIN: No rashes or lesions  NEURO: AAOX1 - Opens eyes to his name     LABS:                          11.7   9.46  )-----------( 322      ( 05 Feb 2017 05:18 )             34.8   05 Feb 2017 05:18    135    |  98     |  12.0   ----------------------------<  101    3.9     |  28.0   |  0.69     Ca    8.8        05 Feb 2017 05:18      MEDICATIONS  (STANDING):  carbidopa/levodopa  25/100 1Tablet(s) Oral four times a day  ALBUTerol   0.042% 1.25milliGRAM(s) Nebulizer every 6 hours  heparin  Injectable 5000Unit(s) SubCutaneous every 12 hours  influenza   Vaccine 0.5milliLiter(s) IntraMuscular once  senna 2Tablet(s) Oral two times a day  saliva substitute (BIOTENE MOISTURIZING MOUTH SPRAY) 1Spray(s) Topical three times a day  amantadine 100milliGRAM(s) Oral two times a day    MEDICATIONS  (PRN):  acetaminophen  Suppository 650milliGRAM(s) Rectal every 6 hours PRN For Temp greater than 38 C (100.4 F)  lactulose Syrup 10Gram(s) Oral every 8 hours PRN constipation  acetaminophen    Suspension. 650milliGRAM(s) Enteral Tube every 6 hours PRN Moderate Pain (4 - 6)  oxyCODONE IR 5milliGRAM(s) Oral every 6 hours PRN Severe Pain (7 - 10)        RADIOLOGY & ADDITIONAL TESTS:  CXR reviewed  KUB reviewed

## 2017-02-07 NOTE — PHYSICAL THERAPY INITIAL EVALUATION ADULT - CRITERIA FOR SKILLED THERAPEUTIC INTERVENTIONS
anticipated equipment needs at discharge/anticipated discharge recommendation/risk reduction/prevention/predicted duration of therapy intervention/therapy frequency/impairments found/functional limitations in following categories/rehab potential

## 2017-02-07 NOTE — PHYSICAL THERAPY INITIAL EVALUATION ADULT - ASR EQUIP NEEDS DISCH PT EVAL
bathing equipment/gait belt/raised toilet seat/shower chair/rolling walker (5 inch wheels)/wheelchair

## 2017-02-07 NOTE — PHYSICAL THERAPY INITIAL EVALUATION ADULT - ACTIVE RANGE OF MOTION EXAMINATION, REHAB EVAL
unable to formally assess  however right shioulder flexion atleast 0-90 degrees based on observation

## 2017-02-07 NOTE — PHYSICAL THERAPY INITIAL EVALUATION ADULT - PASSIVE RANGE OF MOTION EXAMINATION, REHAB EVAL
bilateral lower extremity Passive ROM was WFL (within functional limits)/bilateral upper extremity Passive ROM was WFL (within functional limits)/bilateral shoulder flexion 0-125 degrees- pt may be providing resistance to testing

## 2017-02-07 NOTE — PROGRESS NOTE ADULT - ASSESSMENT
71 year old male debiliatted advanced Parkinson NGT in place  feeding, NPO  unable to follow commands

## 2017-02-07 NOTE — PROGRESS NOTE ADULT - ASSESSMENT
1) advanced parkinsons -->Mental state complicated with viral syndrome/pneumonia.   --> supportive care  --> minimal to no improvement     2) Viral syndrome --> rhinovirus  --> supportive care    3) Parkinsons --> parkinsons meds in ng tube  --> sinemet and amantadine     4) dysphagia --> continue tube feeds for now  Discussed with speech therapy - evaluationm on 2/6 - Rec - NPO - Will re evaluate today    5) Dry mouth --> biotene  --> swabs    6) hypernatremia --> resolve    7) severe protein calorie malnutriton --. nutrition consult appreciated    full code  poor prognosis   refusing GI consult See Note from 2/5 - detailed documentation of conversation with daughter  PT eval - Discussed with PT

## 2017-02-08 NOTE — PROGRESS NOTE ADULT - SUBJECTIVE AND OBJECTIVE BOX
HPI:  71 years old male with PMH of Parkinsonism, total care dependent brought in to the ER for the evaluation of intermittent fever for 2 days and secretions. The daughter states patient has had difficulty swallowing and has not taken his sinemet and amantadine over the last several days prior to admission. Patient admitted and cultured and rvp came back positive for rhinovirus and abx were not started initially. SPeech was called for swallow evaluation and failed multiple times and finally on 1/27 a ng tube was attempted 2 times without success and finally the PA later that night was able to place a ng tube and chest xray at that time showed possible infiltrates and started on levaquin. (1/28). Patient then started to get his parkinsons meds but did not show much improvement. Palliative was called and I spoke to his daughter who is a nurse at another facility and refused for GI to be called for a peg evaluation. patients daughter wanted all things done and does not want DNR DNI.     Patient is slightly more awake and is moving his extremities and did pull out his ng tube more than a couple of times and needed to be replaced. 1:1 was placed overnight for closer observation. Patient is on jevity 1.5 at 60 cc hour currently and speech was recalled but failed again. Patient has not spiked a fever since admission and at times became hypernatremic and needed to be placed on d5w at times.     CC: Cannot vocalize. Opens eyes to voice, not following commands    REVIEW OF SYSTEMS:    unable to obtain secondary to mental status    Vital Signs Last 24 Hrs  T(C): 36.7, Max: 36.7 (02-08 @ 00:56)  T(F): 98, Max: 98 (02-08 @ 00:56)  HR: 72 (70 - 72)  BP: 108/70 (108/70 - 108/70)  BP(mean): --  RR: 18 (18 - 18)  SpO2: 97% (96% - 97%)    PHYSICAL EXAM:    GENERAL: NAD, cachetic, contracted, sitting in chair   HEENT: temporal wasting, dry MM   NECK: soft,   CHEST/LUNG: CTAB  HEART: S1S2+, Regular rate and rhythm; No murmurs  ABDOMEN: Soft,  Nondistended;  EXTREMITIES:  2+ Peripheral Pulses, No edema  SKIN: No rashes or lesions  NEURO: AAOX1 - Opens eyes to his name     LABS:                          11.7   9.46  )-----------( 322      ( 05 Feb 2017 05:18 )             34.8   05 Feb 2017 05:18    135    |  98     |  12.0   ----------------------------<  101    3.9     |  28.0   |  0.69     Ca    8.8        05 Feb 2017 05:18      MEDICATIONS  (STANDING):  carbidopa/levodopa  25/100 1Tablet(s) Oral four times a day  ALBUTerol   0.042% 1.25milliGRAM(s) Nebulizer every 6 hours  heparin  Injectable 5000Unit(s) SubCutaneous every 12 hours  influenza   Vaccine 0.5milliLiter(s) IntraMuscular once  senna 2Tablet(s) Oral two times a day  saliva substitute (BIOTENE MOISTURIZING MOUTH SPRAY) 1Spray(s) Topical three times a day  amantadine 100milliGRAM(s) Oral two times a day    MEDICATIONS  (PRN):  acetaminophen  Suppository 650milliGRAM(s) Rectal every 6 hours PRN For Temp greater than 38 C (100.4 F)  lactulose Syrup 10Gram(s) Oral every 8 hours PRN constipation  acetaminophen    Suspension. 650milliGRAM(s) Enteral Tube every 6 hours PRN Moderate Pain (4 - 6)  oxyCODONE IR 5milliGRAM(s) Oral every 6 hours PRN Severe Pain (7 - 10)          RADIOLOGY & ADDITIONAL TESTS:  CXR reviewed  KUB reviewed

## 2017-02-08 NOTE — PROGRESS NOTE ADULT - ASSESSMENT
1) advanced parkinsons -->Mental state complicated with viral syndrome/pneumonia.   --> supportive care  --> minimal to no improvement     2) Viral syndrome --> rhinovirus  --> resolved    3) Parkinsons --> parkinsons meds in ng tube  --> sinemet and amantadine     4) dysphagia --> continue tube feeds for now  Discussed with speech therapy - evaluationm on 2/6 & 2/7 - Rec - NPO    5) Dry mouth --> biotene  --> swabs    6) hypernatremia --> resolve    7) severe protein calorie malnutriton --. nutrition consult appreciated    full code  poor prognosis   refusing GI consult See Note from 2/5 - detailed documentation of conversation with daughter

## 2017-02-09 NOTE — GOALS OF CARE CONVERSATION - PERSONAL ADVANCE DIRECTIVE - CONVERSATION/DISCUSSION
Prognosis/MOLST Discussed/Treatment Options/Diagnosis
Prognosis/Diagnosis/MOLST Discussed/Treatment Options/Hospice Referral

## 2017-02-09 NOTE — PROGRESS NOTE ADULT - SUBJECTIVE AND OBJECTIVE BOX
INTERVAL HPI/OVERNIGHT EVENTS:    PRESENT SYMPTOMS: SOURCE:  Patient/Family/Team    PAIN SCALE:  0 = none  1 = mild   2 = moderate  3 = severe    Pain:     Dyspnea:  [ ] YES [x ] NO  Anxiety:  [x ] YES [ ] NO  Fatigue: [x ] YES [ ] NO  Nausea: [ ] YES [x ] NO  Constipation [ ] YES x[ ] NO  Loss of Appetite: NPO//  aspiration PNA    Other symptoms: __________    MEDICATIONS  (STANDING):  carbidopa/levodopa  25/100 1Tablet(s) Oral four times a day  ALBUTerol   0.042% 1.25milliGRAM(s) Nebulizer every 6 hours  heparin  Injectable 5000Unit(s) SubCutaneous every 12 hours  influenza   Vaccine 0.5milliLiter(s) IntraMuscular once  senna 2Tablet(s) Oral two times a day  saliva substitute (BIOTENE MOISTURIZING MOUTH SPRAY) 1Spray(s) Topical three times a day  amantadine 100milliGRAM(s) Oral two times a day    MEDICATIONS  (PRN):  acetaminophen  Suppository 650milliGRAM(s) Rectal every 6 hours PRN For Temp greater than 38 C (100.4 F)  lactulose Syrup 10Gram(s) Oral every 8 hours PRN constipation  acetaminophen    Suspension. 650milliGRAM(s) Enteral Tube every 6 hours PRN Moderate Pain (4 - 6)  oxyCODONE IR 5milliGRAM(s) Oral every 6 hours PRN Severe Pain (7 - 10)      Allergies    Allergy Status Unknown    Intolerances        Review of Systems: Reviewed                     Positive:  dysphagia   aspiration PNA   [x ] All others negative  [ ] Unable to obtain due to poor mentation     PHYSICAL EXAM:    Vital Signs Last 24 Hrs  T(C): 35.8, Max: 35.8 (02-08 @ 16:59)  T(F): 96.4, Max: 96.4 (02-08 @ 16:59)  HR: 68 (67 - 71)  BP: 104/57 (78/57 - 104/57)  BP(mean): --  RR: 18 (18 - 20)  SpO2: 98% (98% - 98%)    General: [ ] alert  [ ] oriented x ____ x[ ] lethargic [ ] agitated                  [ x] cachexia  [x ] nonverbal  [ ] coma    HEENT:  dry mouth   NGT    Lungs: [x ] comfortable [ ] tachypnea/labored breathing  [ ] excessive secretions    CV: [ ]x normal  [ ] tachycardia    GI: [ ] normal  [ ] distended  [ ] tender  [ ] no BS               [x ] PEG/NG/OG tube    : [ ] normal  [x ] incontinent  [ ] oliguria/anuria  [ ] [place]    MSK: [ ] normal  [ x] weakness  [ ] edema             [ ] ambulatory  [x ] bedbound/wheelchair bound    Skin: [xx ] normal  [ ] pressure ulcers- Stage_____  [ ] no rash    LABS:                        11.3   9.26  )-----------( 460      ( 09 Feb 2017 06:28 )             33.6               I&O's Summary    I & Os for current day (as of 09 Feb 2017 07:19)  =============================================  IN: 2280 ml / OUT: 0 ml / NET: 2280 ml      RADIOLOGY & ADDITIONAL STUDIES:    ADVANCE DIRECTIVES:  [ ] YES [x ] NO    DNR: [ ] YES [ x] NO      Date Completed:                    MOLST [ ] YES [x ] NO   Date Completed:     COUNSELING:  Advanced Care Planning Discussion - Time Spent ______Minutes.   More than 50% time spent in counseling and coordinating care. __45____ Minutes.     Thank you for the opportunity to assist with the care of this patient.   [facility] [phone].

## 2017-02-09 NOTE — GOALS OF CARE CONVERSATION - PERSONAL ADVANCE DIRECTIVE - CONVERSATION DETAILS
Discussed with daughter goals of care as also discussed by hospitalist.  Plan to discuss advanced care planning Discussed with daughter goals of care as also discussed by hospitalist  discussed with daughter wishes in regards to DNR  DNi she still would like him to be resuscitated if needed and placed on a ventilator if needed

## 2017-02-09 NOTE — PROGRESS NOTE ADULT - SUBJECTIVE AND OBJECTIVE BOX
HPI:  71 years old male with PMH of Parkinsonism, total care dependent brought in to the ER for the evaluation of intermittent fever for 2 days and secretions. The daughter states patient has had difficulty swallowing and has not taken his sinemet and amantadine over the last several days prior to admission. Patient admitted and cultured and rvp came back positive for rhinovirus and abx were not started initially. SPeech was called for swallow evaluation and failed multiple times and finally on 1/27 a ng tube was attempted 2 times without success and finally the PA later that night was able to place a ng tube and chest xray at that time showed possible infiltrates and started on levaquin. (1/28). Patient then started to get his parkinsons meds but did not show much improvement. Palliative was called and I spoke to his daughter who is a nurse at another facility and refused for GI to be called for a peg evaluation. patients daughter wanted all things done and does not want DNR DNI.     Patient is slightly more awake and is moving his extremities and did pull out his ng tube more than a couple of times and needed to be replaced. 1:1 was placed overnight for closer observation. Patient is on jevity 1.5 at 60 cc hour currently and speech was recalled but failed again. Patient has not spiked a fever since admission and at times became hypernatremic and needed to be placed on d5w at times.     CC: Cannot vocalize. Opens eyes to voice, not following commands    REVIEW OF SYSTEMS:    unable to obtain secondary to mental status    Vital Signs Last 24 Hrs  T(C): 35.8, Max: 35.8 (02-08 @ 16:59)  T(F): 96.4, Max: 96.4 (02-08 @ 16:59)  HR: 68 (67 - 71)  BP: 104/57 (78/57 - 104/57)  BP(mean): --  RR: 18 (18 - 20)  SpO2: 98% (98% - 98%)    PHYSICAL EXAM:    GENERAL: NAD, cachetic, contracted, sitting in chair   HEENT: temporal wasting, dry MM   NECK: soft,   CHEST/LUNG: CTAB  HEART: S1S2+, Regular rate and rhythm; No murmurs  ABDOMEN: Soft,  Nondistended;  EXTREMITIES:  2+ Peripheral Pulses, No edema  SKIN: No rashes or lesions  NEURO: AAOX1 - Opens eyes to his name     LABS:                          11.3   9.26  )-----------( 460      ( 09 Feb 2017 06:28 )             33.6   09 Feb 2017 06:28    135    |  98     |  18.0   ----------------------------<  100    4.8     |  26.0   |  0.75     Ca    9.1        09 Feb 2017 06:28      MEDICATIONS  (STANDING):  carbidopa/levodopa  25/100 1Tablet(s) Oral four times a day  ALBUTerol   0.042% 1.25milliGRAM(s) Nebulizer every 6 hours  heparin  Injectable 5000Unit(s) SubCutaneous every 12 hours  influenza   Vaccine 0.5milliLiter(s) IntraMuscular once  senna 2Tablet(s) Oral two times a day  saliva substitute (BIOTENE MOISTURIZING MOUTH SPRAY) 1Spray(s) Topical three times a day  amantadine 100milliGRAM(s) Oral two times a day    MEDICATIONS  (PRN):  acetaminophen  Suppository 650milliGRAM(s) Rectal every 6 hours PRN For Temp greater than 38 C (100.4 F)  lactulose Syrup 10Gram(s) Oral every 8 hours PRN constipation  acetaminophen    Suspension. 650milliGRAM(s) Enteral Tube every 6 hours PRN Moderate Pain (4 - 6)  oxyCODONE IR 5milliGRAM(s) Oral every 6 hours PRN Severe Pain (7 - 10)            RADIOLOGY & ADDITIONAL TESTS:  CXR reviewed  KUB reviewed

## 2017-02-09 NOTE — PROGRESS NOTE ADULT - ASSESSMENT
1) advanced parkinsons -->Mental state complicated with viral syndrome/pneumonia.   --> minimal to no improvement   -ensure safety    2) Viral syndrome --> rhinovirus  --> resolved    3) Parkinsons --> parkinsons meds   --> sinemet and amantadine     4) dysphagia --> continue tube feeds for now  Discussed with speech therapy - evaluationm on 2/6 & 2/7 - Rec - NPO    5) Dry mouth --> biotene  --> swabs    6) hypernatremia --> resolve    7) severe protein calorie malnutriton --. nutrition consult appreciated    full code - await palliative recs reg code status  poor prognosis   refusing GI consult See Note from 2/5 - detailed documentation of conversation with daughter

## 2017-02-10 NOTE — DISCHARGE NOTE ADULT - HOSPITAL COURSE
71 years old male with PMH of Parkinsonism, total care dependent brought in to the ER for the evaluation of intermittent fever for 2 days and secretions. The daughter stated patient has had difficulty swallowing and has not taken his sinemet and amantadine over the last several days prior to admission. Patient admitted and cultured and rvp came back positive for rhinovirus and abx were not started initially. SPeech was called for swallow evaluation and failed multiple times and finally on 1/27 a ng tube was attempted 2 times without success and repeat  ng tube placed and chest xray at that time showed possible infiltrates and started on levaquin. (1/28). Patient then started to get his parkinsons meds but did not show much improvement. Palliative was called and discussed with daughter, she  refused for GI to be called for a peg evaluation, His Full code.  Swallow eval attempted multiple times - failed - recommend NPO for safety, Daughter Refused PEG. In light of this, discussed extensively with daughter that he is high risk of aspiration and He will only be able to take comfort feeds, hospice was consulted after daughter agreeable and home with hospice set up.

## 2017-02-10 NOTE — DISCHARGE NOTE ADULT - MEDICATION SUMMARY - MEDICATIONS TO STOP TAKING
I will STOP taking the medications listed below when I get home from the hospital:    Haldol Decanoate  --  intramuscular

## 2017-02-10 NOTE — PROGRESS NOTE ADULT - NSHPATTENDINGPLANDISCUSS_GEN_ALL_CORE
daughter, CM, hospice
RN, PT and speech therapist
RN, daughter, palliative
daughter
discussed with patient & RN at bedside
daughter, RN, CM

## 2017-02-10 NOTE — DISCHARGE NOTE ADULT - SECONDARY DIAGNOSIS.
Debility Parkinson disease Upper respiratory tract infection, unspecified type Protein calorie malnutrition Failure to thrive in adult

## 2017-02-10 NOTE — PROGRESS NOTE ADULT - SUBJECTIVE AND OBJECTIVE BOX
HPI:  71 years old male with PMH of Parkinsonism, total care dependent brought in to the ER for the evaluation of intermittent fever for 2 days and secretions. The daughter states patient has had difficulty swallowing and has not taken his sinemet and amantadine over the last several days prior to admission. Patient admitted and cultured and rvp came back positive for rhinovirus and abx were not started initially. SPeech was called for swallow evaluation and failed multiple times and finally on 1/27 a ng tube was attempted 2 times without success and finally the PA later that night was able to place a ng tube and chest xray at that time showed possible infiltrates and started on levaquin. (1/28). Patient then started to get his parkinsons meds but did not show much improvement. Palliative was called and I spoke to his daughter who is a nurse at another facility and refused for GI to be called for a peg evaluation. patients daughter wanted all things done and does not want DNR DNI.     Patient is slightly more awake and is moving his extremities and did pull out his ng tube more than a couple of times and needed to be replaced. 1:1 was placed overnight for closer observation. Patient is on jevity 1.5 at 60 cc hour currently and speech was recalled but failed again. Patient has not spiked a fever since admission and at times became hypernatremic and needed to be placed on d5w at times.     CC: Cannot vocalize. Opens eyes to voice, not following commands    REVIEW OF SYSTEMS:    unable to obtain secondary to mental status    Vital Signs Last 24 Hrs  T(C): 36.6, Max: 36.6 (02-10 @ 13:32)  T(F): 97.8, Max: 97.8 (02-10 @ 13:32)  HR: 68 (66 - 68)  BP: 103/60 (94/52 - 110/66)  BP(mean): --  RR: 18 (18 - 18)  SpO2: 97% (95% - 97%)  PHYSICAL EXAM:    GENERAL: NAD, cachetic, contracted, sitting in chair   HEENT: temporal wasting, dry MM   NECK: soft,   CHEST/LUNG: CTAB  HEART: S1S2+, Regular rate and rhythm; No murmurs  ABDOMEN: Soft,  Nondistended;  EXTREMITIES:  2+ Peripheral Pulses, No edema  SKIN: No rashes or lesions  NEURO: AAOX1 - Opens eyes to his name     LABS:                          11.3   9.26  )-----------( 460      ( 09 Feb 2017 06:28 )             33.6   09 Feb 2017 06:28    135    |  98     |  18.0   ----------------------------<  100    4.8     |  26.0   |  0.75     Ca    9.1        09 Feb 2017 06:28      MEDICATIONS  (STANDING):  carbidopa/levodopa  25/100 1Tablet(s) Oral four times a day  ALBUTerol   0.042% 1.25milliGRAM(s) Nebulizer every 6 hours  heparin  Injectable 5000Unit(s) SubCutaneous every 12 hours  influenza   Vaccine 0.5milliLiter(s) IntraMuscular once  senna 2Tablet(s) Oral two times a day  saliva substitute (BIOTENE MOISTURIZING MOUTH SPRAY) 1Spray(s) Topical three times a day  amantadine 100milliGRAM(s) Oral two times a day    MEDICATIONS  (PRN):  acetaminophen  Suppository 650milliGRAM(s) Rectal every 6 hours PRN For Temp greater than 38 C (100.4 F)  lactulose Syrup 10Gram(s) Oral every 8 hours PRN constipation  acetaminophen    Suspension. 650milliGRAM(s) Enteral Tube every 6 hours PRN Moderate Pain (4 - 6)  oxyCODONE IR 5milliGRAM(s) Oral every 6 hours PRN Severe Pain (7 - 10)            RADIOLOGY & ADDITIONAL TESTS:  CXR reviewed  KUB reviewed

## 2017-02-10 NOTE — DISCHARGE NOTE ADULT - MEDICATION SUMMARY - MEDICATIONS TO TAKE
I will START or STAY ON the medications listed below when I get home from the hospital:    Sinemet 25 mg-100 mg oral tablet  -- 1 tab(s) by mouth 4 times a day  -- Indication: For Parkinson disease    amantadine 100 mg oral capsule  -- 1 cap(s) by mouth 2 times a day  -- Indication: For Parkinson disease    albuterol 1.25 mg/3 mL (0.042%) inhalation solution  -- 3 milliliter(s) inhaled 4 times a day, As Needed -for shortness of breath and/or wheezing  -- Indication: For Aspiration into airway, initial encounter    lactulose 10 g/15 mL oral and rectal liquid  -- 15 milliliter(s) by mouth and rectally once a day, As Needed -for constipation  -- Indication: For constipation

## 2017-02-10 NOTE — DISCHARGE NOTE ADULT - PATIENT PORTAL LINK FT
“You can access the [facility] Patient Portal, offered by [facility], by registering with the following website: [email or web address]”

## 2017-02-10 NOTE — DISCHARGE NOTE ADULT - CARE PLAN
Principal Discharge DX:	Aspiration into airway, initial encounter  Goal:	comfort feeds only  Instructions for follow-up, activity and diet:	f./u with hospice MD  Activity as tolerated  Diet - comfort feeds  Secondary Diagnosis:	Debility  Secondary Diagnosis:	Parkinson disease  Secondary Diagnosis:	Upper respiratory tract infection, unspecified type  Secondary Diagnosis:	Protein calorie malnutrition  Secondary Diagnosis:	Failure to thrive in adult

## 2017-02-10 NOTE — PROGRESS NOTE ADULT - ASSESSMENT
1) advanced parkinsons -->Mental state complicated with viral syndrome/pneumonia.   --> minimal to no improvement   -ensure safety    2) Viral syndrome --> rhinovirus  --> resolved    3) Parkinsons --> parkinsons meds   --> sinemet and amantadine     4) dysphagia --> continue tube feeds for now  Discussed with speech therapy - evaluationm on 2/6 & 2/7 - Rec - NPO    5) Dry mouth --> biotene  --> swabs    6) hypernatremia --> resolve    7) severe protein calorie malnutriton --. nutrition consult appreciated    full code   poor prognosis   refusing GI consult See Note from 2/5 - detailed documentation of conversation with daughter      Home with hospice in am

## 2017-02-11 NOTE — PROGRESS NOTE ADULT - SUBJECTIVE AND OBJECTIVE BOX
CHIEF COMPLAINT/INTERVAL HISTORY:    Patient is a 71y old  Male who presents with a chief complaint of fever and difficulty swallowing (10 Feb 2017 11:09)      HPI:  71 years old male with PMH of Parkinsonism, total care dependent brought in to the ER for the evaluation of intermittent fever for the past 2 days associated with congestion and  thick secretions. The daughter states patient has difficulty and swallowing and his PO intake is minimal for the past few days. No chest pain, SOB, abdominal pain, diarrhea or constipation. (25 Jan 2017 18:48)      SUBJECTIVE & OBJECTIVE: Pt seen and examined at bedside.     ICU Vital Signs Last 24 Hrs  T(C): 36.5, Max: 36.5 (02-11 @ 16:32)  T(F): 97.7, Max: 97.7 (02-11 @ 16:32)  HR: 71 (62 - 71)  BP: 108/68 (89/61 - 126/84)  BP(mean): --  ABP: --  ABP(mean): --  RR: 18 (16 - 18)  SpO2: 100% (97% - 100%)        MEDICATIONS  (STANDING):  carbidopa/levodopa  25/100 1Tablet(s) Oral four times a day  heparin  Injectable 5000Unit(s) SubCutaneous every 12 hours  senna 2Tablet(s) Oral two times a day  saliva substitute (BIOTENE MOISTURIZING MOUTH SPRAY) 1Spray(s) Topical three times a day  amantadine 100milliGRAM(s) Oral two times a day    MEDICATIONS  (PRN):  lactulose Syrup 10Gram(s) Oral every 8 hours PRN constipation  acetaminophen    Suspension. 650milliGRAM(s) Enteral Tube every 6 hours PRN Moderate Pain (4 - 6)  oxyCODONE IR 5milliGRAM(s) Oral every 6 hours PRN Severe Pain (7 - 10)        PHYSICAL EXAM:seen and examined,NGT removed today.Non verbal.mildly agitated    GENERAL: NAD,cachectic ,non verbal  Neuro-doesnt follow command,Moves all extremities  CHEST/LUNG: Clear to auscultation bilaterally; No rales, rhonchi, wheezing, or rubs  HEART: Regular rate and rhythm; No murmurs, rubs, or gallops  ABDOMEN: Soft, Nontender, Nondistended; Bowel sounds present  EXTREMITIES:  2+ Peripheral Pulses, No clubbing, cyanosis, or edema    LABS:                CAPILLARY BLOOD GLUCOSE      RECENT CULTURES:      RADIOLOGY & ADDITIONAL TESTS:    Health Issues:  Unspecified foreign body in respiratory tract, part unspecified causing other injury, initial encounter  Aspiration into airway, initial encounter  Parkinson disease  Anxiety  Parkinsons  Aspiration into airway, initial encounter  Debility  Parkinson disease  Dysphagia  Upper respiratory tract infection, unspecified type      DVT/GI ppx  Discussed with pt @ bedside

## 2017-02-11 NOTE — PROGRESS NOTE ADULT - ASSESSMENT
1) advanced parkinsons -->Mental state complicated with viral syndrome/pneumonia.   --> minimal to no improvement   -ensure safety    2) Viral syndrome --> rhinovirus  --> resolved    3) Parkinsons --> parkinsons meds   --> sinemet and amantadine     4) dysphagia --> continue tube feeds for now  Discussed with speech therapy - evaluationm on 2/6 & 2/7 - Rec - NPO    5) Dry mouth --> biotene  --> swabs    6) hypernatremia --> resolve    7) severe protein calorie malnutriton --. nutrition consult appreciated    full code   poor prognosis   refusing GI consult See Note from 2/5 - detailed documentation of conversation with daughter      Home with hospice today.Discharge orders and note in

## 2017-03-09 NOTE — CONSULT NOTE ADULT - PROBLEM SELECTOR RECOMMENDATION 4
-lengthy Advanced Care Planning discussion with daughter, she understands guarded prognosis. She is a nurse at [employer] and as a nurse she understands, but as a daughter it is difficult to let go. She knows he wouldn't want aggressive measures like CPR/ or machines - confirmed DNR/I. She would like to give him a chance to potentially get back home with hospice and his family but realizes that may not be a realistic possibility. She is trying to get the rest of the family in to see him, her mother is in [place], I suggested she have mom talk to him on the phone just in case he were to die before she is able to fly back to [place]. Will continue to provide support. Guarded prognosis. -lengthy Advanced Care Planning discussion with daughter, she understands guarded prognosis. She is a nurse at [employer] and as a nurse she understands, but as a daughter it is difficult to let go. She knows he wouldn't want aggressive measures like CPR/ or machines - confirmed DNR/I. She would like to give him a chance to potentially get back home with hospice and his family but realizes that may not be a realistic possibility. She is trying to get the rest of the family in to see him, her mother is in [place], I suggested she have mom talk to him on the phone just in case he were to die before she is able to fly back to [place]. Will continue to provide support. Guarded prognosis. comfort medications ordered as needed just in case symptoms arise.

## 2017-03-09 NOTE — ED ADULT NURSE REASSESSMENT NOTE - NS ED NURSE REASSESS COMMENT FT1
Pt remains nonverbal. hypotensive at thi time. Dr Lu aware. daughter at bedside. SPO2 @ 100% via venturi mask.

## 2017-03-09 NOTE — ED PROVIDER NOTE - PROGRESS NOTE DETAILS
Hospice nurse Luh called to touch base:  She clarified that she had told the patient to give 1 or 2 mg morphine at home, not 10mg .  [phone] BP had dropped despite aggressive IVF and decision made with verbal and written consent of daughter to place central venous access.  IJ placed without issues. ICU at bedside to evaluate patient.

## 2017-03-09 NOTE — H&P ADULT - ASSESSMENT
Impression/Plan;    1) Septic Shock: related to severe aspiration pneumonia. Required Pressor support despite > 30cc/Kg of IV fluid resuscitation. Will continue hydration, F/U Lactate and cultures. Started IV Vanco and Zosyn for Abx coverage, given recent hospitalization in january 2107.    2) Hypoxic respiratory failure secondary to Aspiration PNA: will continue as above. with degree of PNA and respiraory compromise, there is a substantial risk for further deterioration and a need for intubation/vent support. Goals of care discussed with daughter and pt is now DNR/DNI, will continue with supportive care.    3) Acute renal failure: Likely ATN due to septic shock. Continue hydration and will F/U BUN/Crt and potassium level.    4) Severe protein malnutrition: Will begin enteral feeds, if patient begins to stabilize. At this point with chronic progressive aspiration history and respiratory status, can not start due to severe risk of further compromising patient.    5) Severe Parkinson's disease, with bed/wheelchair status    i spoke at length with the patient's daughter, who is a nurse and his primary caretaker. She has acknowledged his substantial decline over the last several months, including worsening aspiration, declining mental status and poor PO intake. She is mostly concerned that he be comfortable and not be in any pain or suffer. She is OK with continuing IV fluids, ABX, and the priossors for now. However, she would not want to place him on a ventilator or for him to have CPR. She would want him to pass away peacefully and naturally, which is reflective of his own wishes.  DNR/DNI have been put in place. I advised her to contact her family, including her mother in [place], as the patient's condition is likely to worsen and this may be an end of life situation, which he understands. i have also ask the Palliative care service to see the patient and his daughter.    A total of 60 mins of time was spent evaluating  and treating this critical patient including counseling and discussing the goals of care with the daughter.

## 2017-03-09 NOTE — H&P ADULT - RS GEN PE MLT RESP DETAILS PC
diminished breath sounds, L/diminished breath sounds, R/intercostal retractions/respirations labored/rhonchi

## 2017-03-09 NOTE — CONSULT NOTE ADULT - PROBLEM SELECTOR RECOMMENDATION 2
-daughter okay with pressors for now as she is trying to wait until her mother (who is in [place]) to come see him before he dies.   -pressor support

## 2017-03-09 NOTE — CONSULT NOTE ADULT - ATTENDING COMMENTS
COUNSELING:  Face to face meeting to discuss Advanced Care Planning - Time Spent 20 Minutes.       Thank you for the opportunity to assist with the care of this patient.   [facility] [phone].

## 2017-03-09 NOTE — ED ADULT TRIAGE NOTE - CHIEF COMPLAINT QUOTE
pt on home hospice; full code per ems. family called because he was less responsive; typically communicates but is not today. patient arrives with difficulty breathing. dr nicholson to bedside for eval. Hospice advised family to give patient 10 mg morphine. unknown route.

## 2017-03-09 NOTE — ED ADULT NURSE NOTE - OBJECTIVE STATEMENT
Patient received nonverbal. difficult breathing at this time, nonrebreathing mask in place. SPO2 at 95%. hypotensive at this time. Dr Lu at bedside. pt come from home hospice, daughter at bedside. clear BBS. CM in  place. NSR. abd soft, nondistended and nontender.

## 2017-03-09 NOTE — CONSULT NOTE ADULT - ASSESSMENT
71M with end stage parkinson's who was home on hospice, admitted now with shock due to right lower lobe pneumonia, acute respiratory failure with grave prognosis. 71M with end stage parkinson's who was home on hospice, admitted now with shock due to right lower lobe pneumonia, acute kidney injury, acute respiratory failure with grave prognosis.

## 2017-03-09 NOTE — ED PROCEDURE NOTE - CPROC ED INFUS LINE DETAIL1
The catheter was placed using sterile technique./All lumen(s) aspirated and flushed without difficulty./Ultrasound guidance was used during placement./The location was identified, and the area was draped and prepped./The guidewire was recovered.

## 2017-03-09 NOTE — ED PROVIDER NOTE - MEDICAL DECISION MAKING DETAILS
79M with end stage Parkinsons pw AMS, hypothermia, tachypneic - - full code- workup for sepsis/ cardiac dz.

## 2017-03-09 NOTE — H&P ADULT - HISTORY OF PRESENT ILLNESS
82M with PmHx of severely advanced parkinson's disease, chronic aspiration, dementia, HTN, who is bed and wheelchair bound at home. Presents with worsening dyspnea and lethargy over the last few days with noticable aspiration by his daughter. Upon arrival in the ER he was noted to be obtunded with response to painful stimuli, but no response to name. He was tachypneic with increased use of his accessory muscles. He was in shock with BPs in the 70-80s, a lactate of 6 and a CXR that revealed a large RLL pneumonia, consistent with an aspiration PNA. He was aggressively resuscitated with 4-5 liters of IV fluid, however remained hypotensive and ultimately was started on Levophed for BP support.  Of note he was also found to be in acute renal failure with a Crt that had doubled to 1.73.

## 2017-03-09 NOTE — CONSULT NOTE ADULT - SUBJECTIVE AND OBJECTIVE BOX
Patient's Name: Jeanclaude Mars  1945    HPI: 71M with end stage parkinson's who was home on hospice admitted today with acute respiratory failure, hypotension/shock with possible aspiration pneumonia.     PERTINENT PMH REVIEWED: Yes     PAST MEDICAL & SURGICAL HISTORY:  Parkinsons  S/P hernia repair    SOCIAL HISTORY:  nonsmoker currently                                    Admitted from:  home     Surrogate - daughter Maddi Traylor     FAMILY HISTORY: non contributory    Allergies    Allergy Status Unknown    Present Symptoms:     Dyspnea: 3   Nausea/Vomiting: No  Anxiety:  No  Depression: No  Fatigue: No  Loss of appetite: No    Pain: none    Review of Systems: Reviewed                  Unable to obtain due to poor mentation     MEDICATIONS  (STANDING):  dextrose 5% + sodium chloride 0.45%. 1000milliLiter(s) IV Continuous <Continuous>  norepinephrine Infusion 0.01MICROgram(s)/kG/Min IV Continuous <Continuous>    PHYSICAL EXAM:    Vital Signs Last 24 Hrs  T(C): 35.1, Max: 35.1 ( @ 11:30)  T(F): 95.2, Max: 95.2 ( @ 11:30)  HR: 93 (79 - 93)  BP: 120/76 (69/42 - 128/60)  BP(mean): --  RR: 36 (24 - 40)  SpO2: 95% (94% - 100%)    General: lethargic                  Karnofsky:  10%    HEENT: normal       Lungs: tachypnea/labored breathing      CV: normal      GI: normal      : normal     Skin: no rash    LABS:                        10.7   0.3   )-----------( 97       ( 09 Mar 2017 12:08 )             31.6     09 Mar 2017 13:17    137    |  103    |  33.0   ----------------------------<  130    5.5     |  20.0   |  1.73     Ca    8.4        09 Mar 2017 13:17    TPro  5.3    /  Alb  2.2    /  TBili  0.3    /  DBili  x      /  AST  18     /  ALT  9      /  AlkPhos  112    09 Mar 2017 13:17    Urinalysis Basic - ( 09 Mar 2017 14:57 )    Color: Yellow / Appearance: Clear / S.020 / pH: x  Gluc: x / Ketone: Negative  / Bili: Negative / Urobili: Negative mg/dL   Blood: x / Protein: 15 mg/dL / Nitrite: Negative   Leuk Esterase: Negative / RBC: 0-2 /HPF / WBC 0-2   Sq Epi: x / Non Sq Epi: Occasional / Bacteria: x    RADIOLOGY & ADDITIONAL STUDIES:    CXR:  Moderate size right lower lobe infiltrate.    ADVANCE DIRECTIVES:   DNR/I YES Patient's Name: Jeanclaude Mars  1945    HPI: 71M with end stage parkinson's who was home on hospice admitted today with acute respiratory failure, hypotension/ septic shock with right lower lobe pneumonia, as well as acute kidney injury.     PERTINENT PMH REVIEWED: Yes     PAST MEDICAL & SURGICAL HISTORY:  Parkinsons  S/P hernia repair    SOCIAL HISTORY:  nonsmoker currently                                    Admitted from:  home     Surrogate - daughter Maddi Traylor     FAMILY HISTORY: non contributory    Allergies    Allergy Status Unknown    Present Symptoms:     Dyspnea: 3   Nausea/Vomiting: No  Anxiety:  No  Depression: No  Fatigue: No  Loss of appetite: No    Pain: none    Review of Systems: Reviewed                  Unable to obtain due to poor mentation     MEDICATIONS  (STANDING):  dextrose 5% + sodium chloride 0.45%. 1000milliLiter(s) IV Continuous <Continuous>  norepinephrine Infusion 0.01MICROgram(s)/kG/Min IV Continuous <Continuous>    PHYSICAL EXAM:    Vital Signs Last 24 Hrs  T(C): 35.1, Max: 35.1 ( @ 11:30)  T(F): 95.2, Max: 95.2 ( @ 11:30)  HR: 93 (79 - 93)  BP: 120/76 (69/42 - 128/60)  BP(mean): --  RR: 36 (24 - 40)  SpO2: 95% (94% - 100%)    General: lethargic                  Karnofsky:  10%    HEENT: normal       Lungs: tachypnea/labored breathing      CV: normal      GI: normal      : normal     Skin: no rash    LABS:                        10.7   0.3   )-----------( 97       ( 09 Mar 2017 12:08 )             31.6     09 Mar 2017 13:17    137    |  103    |  33.0   ----------------------------<  130    5.5     |  20.0   |  1.73     Ca    8.4        09 Mar 2017 13:17    TPro  5.3    /  Alb  2.2    /  TBili  0.3    /  DBili  x      /  AST  18     /  ALT  9      /  AlkPhos  112    09 Mar 2017 13:17    Urinalysis Basic - ( 09 Mar 2017 14:57 )    Color: Yellow / Appearance: Clear / S.020 / pH: x  Gluc: x / Ketone: Negative  / Bili: Negative / Urobili: Negative mg/dL   Blood: x / Protein: 15 mg/dL / Nitrite: Negative   Leuk Esterase: Negative / RBC: 0-2 /HPF / WBC 0-2   Sq Epi: x / Non Sq Epi: Occasional / Bacteria: x    RADIOLOGY & ADDITIONAL STUDIES:    CXR:  Moderate size right lower lobe infiltrate.    ADVANCE DIRECTIVES:   DNR/I YES

## 2017-03-09 NOTE — ED PROVIDER NOTE - OBJECTIVE STATEMENT
71yoM with h/o end stage Parkinsons disease on home hospice, currently full code, pw AMS since yesterday , associated with difficulty swallowing, decreased PO intake. yesterday daughter  noticed gurgling and swallowing- daughter tried suctioned him -- today patient was not eating/drinking;  she noticed BP in 90s/50sand patientminimally responding- she called hospice nurse and hospice recommended giving 10mg morphine for respiratory comfort.  As per daughter, josemanuel is still full code. States that he previously expressed desire not to be intubated but that she did not feel comfortable signing orders at this time w/o discussing with mother.  BP runs in 90s/60s at baseline. HR 92;BP 89/52  95-97%onNRB  FSG 97 in field    PMD: Aleks Pepe  NKDA 71yoM with h/o end stage Parkinsons disease on home hospice, currently full code, pw AMS since yesterday , associated with difficulty swallowing, decreased PO intake. yesterday daughter  noticed gurgling and swallowing- daughter tried suctioned him -- today patient was not eating/drinking;  she noticed BP in 90s/50sand patientminimally responding- she called hospice nurse and hospice recommended giving  morphine for respiratory comfort and daughter gave 10mg.  As per daughter, josemanuel is still full code. States that he previously expressed desire not to be intubated but that she did not feel comfortable signing orders at this time w/o discussing with mother.  BP runs in 90s/60s at baseline. HR 92;BP 89/52  95-97%onNRB  FSG 97 in field    PMD: Aleks ePpe  NKDA

## 2017-03-10 NOTE — DISCHARGE NOTE FOR THE EXPIRED PATIENT - HOSPITAL COURSE
71yoM with h/o end stage Parkinsons disease on home hospice who presented to ER with  AMS since yesterday , associated with difficulty swallowing, decreased PO intake. Yesterday daughter noticed gurgling and swallowing- daughter tried suctioned him but stated he had trouble swallowing . When she noticed his  BP in 90s/50s and patient minimally responding- she called hospice nurse transporting patient to ER where staff stated that as per daughter, josemanuel as still full code stated that he previously expressed desire not to be intubated but that she did not feel comfortable signing orders at this time w/o discussing with mother. The patient was taken to ICU for treatment of  aspiration pneumonia with iv pressors for shock. while in the ICU the family ( pt daughter) made him a DNR.   At 02:10 I was called to bedside for non readable blood pressure and asystole on ECG monitor. Pt was examined and found to be unresponsive and to have no spontaneous heart beat or respirations via ascultation, no  palpable pulse at the carotid and femoral artery sites, pupils were fixed , dilated and non reactive to light. Pt was pronounced dead at 02:10, family was notified by leaving message on only telephone number given and awaiting call back at this time.

## 2017-03-15 LAB
CULTURE RESULTS: SIGNIFICANT CHANGE UP
SPECIMEN SOURCE: SIGNIFICANT CHANGE UP

## 2021-03-31 NOTE — PROCEDURE NOTE - NSPROCDETAILS_GEN_ALL_CORE
It was good to see you  Take the lexapro same time every day  Try to get outside, do exercises  Heat pad at night  Keep same night time routine  Trazodone at night, same time each night.  Xanax can use if having acute anxiety attack  Work note provided  Follow up next week  Thank you  Dr. Best                                                                   SLEEP HYGIENE    Sleep disruption is common, especially during times when you may feel emotionally overwhelmed. Anxiety, relentless replaying of the day's events, and heightened emotions may significantly interfere with your sleep. Lack of sleep robs you of needed rest, making management of your illness more difficult.    Bringing sleep patterns under control and working at a consistent stable pattern is very important to illness management. You need your rest.    The most common cause of insomnia is a change in our daily routine. For example traveling, change in work hours, disruption of other behaviors (eating, exercise, leisure, etc.) relationship conflicts may cause sleep problems.    Paying attention to good sleep hygiene is the most important thing you can do to maintain good sleep.    DO:  1. Go to bed at the same time each day.  2. Get up from bed at the same time each day.  3. Get regular exercise each day, preferably in the morning. There is good evidence that regular exercise improves restful sleep. This includes stretching and aerobic exercise.  4. Get regular exposure to outdoor or bright lights, especially in the late afternoon.  5. Keep the temperature in your bedroom comfortable.  6. Keep the bedroom quiet when sleeping.  7. Keep the bedroom dark enough to facilitate sleep.  8. Use your bed only for sleep and sex.  9. Take medications as directed. It is often helpful to take prescribed sleeping pills one hour before bedtime, so they are causing drowsiness when you lie down or 10 hours before getting up, to avoid daytime drowsiness.  10. Use 
a relaxation exercise just before going to sleep such has muscle relaxation, imagery, massage, warm bath etc.  11. Keep your feet and hands warm. Wear warm socks and or mittens or gloves to bed.    DO NOT:  1. Exercise just before going to bed.  2. Engage in stimulating activity just before bed, such as playing a competitive game, watching an exciting program on television or a movie, or having an important discussion with a loved one.  3. Have caffeine in the evening (coffee, many teas, chocolate, soda, etc.)  4. Read or watch television in bed.  5. Use alcohol to help you sleep.  6. Go to bed too hungry or too full.  7. Take another person's sleeping pills.  8. Take over-the-counter sleeping pills without your doctor's knowledge. Tolerance can develop rapidly with these medications. Diphenhydramine (an ingredient commonly found in over-the-counter sleep medications) can have serious side effects for elderly patients.  9. Take daytime naps.  10. Command yourself to go to sleep. This only makes your mind and body more alert.    If you lie in bed awake for more than 20-30 minutes, get up, go to a different room ( or different part of the bedroom), participate in a quiet activity (e.g. non-excitable reading or television), then return to bed when you feel sleepy. Do this as many times during the night as needed.  
location identified, feeding tube inserted
Right Nostril/location identified, feeding tube inserted
nasogastric
nasogastric/CXR ordered to confirm placement

## 2023-02-08 NOTE — ED ADULT TRIAGE NOTE - NS ED TRIAGE HISTORIAN
EMS Finasteride Counseling:  I discussed with the patient the risks of use of finasteride including but not limited to decreased libido, decreased ejaculate volume, gynecomastia, and depression. Women should not handle medication.  All of the patient's questions and concerns were addressed. Finasteride Male Counseling: Finasteride Counseling:  I discussed with the patient the risks of use of finasteride including but not limited to decreased libido, decreased ejaculate volume, gynecomastia, and depression. Women should not handle medication.  All of the patient's questions and concerns were addressed.

## 2023-03-29 NOTE — PROGRESS NOTE ADULT - PROBLEM SELECTOR PLAN 2
Takes prednisone, tacrolimus, mycophenolate.     Holding mycophenolate given COVID  Continue tacro   Change pred to dex given COVID   NGT/  NPO  swallow evaluation failed performed on several occassions unable to follow commands new foreign body on xray

## 2024-04-09 NOTE — PATIENT PROFILE ADULT. - PURPOSEFUL PROACTIVE ROUNDING
Ajit Gorman was seen and treated in our emergency department on 4/9/2024.  He may return to work on 04/13/2024.       If you have any questions or concerns, please don't hesitate to call.      Shiva Mera PA-C
Support Person